# Patient Record
Sex: FEMALE | Race: WHITE
[De-identification: names, ages, dates, MRNs, and addresses within clinical notes are randomized per-mention and may not be internally consistent; named-entity substitution may affect disease eponyms.]

---

## 2018-02-11 ENCOUNTER — HOSPITAL ENCOUNTER (INPATIENT)
Dept: HOSPITAL 47 - EC | Age: 63
LOS: 2 days | Discharge: ANOTHER HOSPITAL | DRG: 871 | End: 2018-02-13
Attending: HOSPITALIST | Admitting: HOSPITALIST
Payer: COMMERCIAL

## 2018-02-11 VITALS — BODY MASS INDEX: 18.6 KG/M2

## 2018-02-11 DIAGNOSIS — Z79.899: ICD-10-CM

## 2018-02-11 DIAGNOSIS — Z87.440: ICD-10-CM

## 2018-02-11 DIAGNOSIS — K70.30: ICD-10-CM

## 2018-02-11 DIAGNOSIS — J18.9: ICD-10-CM

## 2018-02-11 DIAGNOSIS — F10.10: ICD-10-CM

## 2018-02-11 DIAGNOSIS — E87.1: ICD-10-CM

## 2018-02-11 DIAGNOSIS — G93.41: ICD-10-CM

## 2018-02-11 DIAGNOSIS — E86.0: ICD-10-CM

## 2018-02-11 DIAGNOSIS — A40.9: Primary | ICD-10-CM

## 2018-02-11 DIAGNOSIS — E87.4: ICD-10-CM

## 2018-02-11 DIAGNOSIS — Z16.11: ICD-10-CM

## 2018-02-11 DIAGNOSIS — I48.91: ICD-10-CM

## 2018-02-11 DIAGNOSIS — F17.200: ICD-10-CM

## 2018-02-11 DIAGNOSIS — N39.0: ICD-10-CM

## 2018-02-11 DIAGNOSIS — D69.6: ICD-10-CM

## 2018-02-11 DIAGNOSIS — R65.21: ICD-10-CM

## 2018-02-11 DIAGNOSIS — J96.01: ICD-10-CM

## 2018-02-11 DIAGNOSIS — E83.51: ICD-10-CM

## 2018-02-11 DIAGNOSIS — N17.9: ICD-10-CM

## 2018-02-11 LAB
ALBUMIN SERPL-MCNC: 2.5 G/DL (ref 3.5–5)
ALP SERPL-CCNC: 63 U/L (ref 38–126)
ALT SERPL-CCNC: 39 U/L (ref 9–52)
ANION GAP SERPL CALC-SCNC: 15 MMOL/L
APTT BLD: 22.8 SEC (ref 22–30)
AST SERPL-CCNC: 115 U/L (ref 14–36)
BILIRUB UR QL STRIP.AUTO: (no result)
BUN SERPL-SCNC: 104 MG/DL (ref 7–17)
CALCIUM SPEC-MCNC: 9.3 MG/DL (ref 8.4–10.2)
CELLS COUNTED: 200
CHLORIDE SERPL-SCNC: 91 MMOL/L (ref 98–107)
CO2 BLDA-SCNC: 22 MMOL/L (ref 19–24)
CO2 BLDA-SCNC: 25 MMOL/L (ref 19–24)
CO2 SERPL-SCNC: 28 MMOL/L (ref 22–30)
ERYTHROCYTE [DISTWIDTH] IN BLOOD BY AUTOMATED COUNT: 4.99 M/UL (ref 3.8–5.4)
ERYTHROCYTE [DISTWIDTH] IN BLOOD: 13.2 % (ref 11.5–15.5)
GLUCOSE BLD-MCNC: 105 MG/DL (ref 75–99)
GLUCOSE BLD-MCNC: 60 MG/DL (ref 75–99)
GLUCOSE SERPL-MCNC: 88 MG/DL (ref 74–99)
HCO3 BLDA-SCNC: 21 MMOL/L (ref 21–25)
HCO3 BLDA-SCNC: 23 MMOL/L (ref 21–25)
HCT VFR BLD AUTO: 50.5 % (ref 34–46)
HGB BLD-MCNC: 16.6 GM/DL (ref 11.4–16)
HYALINE CASTS UR QL AUTO: 125 /LPF (ref 0–2)
INR PPP: 1 (ref ?–1.2)
LYMPHOCYTES # BLD MANUAL: 0.53 K/UL (ref 1–4.8)
MAGNESIUM SPEC-SCNC: 2.3 MG/DL (ref 1.6–2.3)
MCH RBC QN AUTO: 33.3 PG (ref 25–35)
MCHC RBC AUTO-ENTMCNC: 32.9 G/DL (ref 31–37)
MCV RBC AUTO: 101.2 FL (ref 80–100)
NEUTROPHILS NFR BLD MANUAL: 42 %
NEUTS SEG # BLD MANUAL: 10 K/UL (ref 1.3–7.7)
PCO2 BLDA: 58 MMHG (ref 35–45)
PCO2 BLDA: 69 MMHG (ref 35–45)
PH BLDA: 7.13 [PH] (ref 7.35–7.45)
PH BLDA: 7.16 [PH] (ref 7.35–7.45)
PH UR: 5 [PH] (ref 5–8)
PLATELET # BLD AUTO: 213 K/UL (ref 150–450)
PO2 BLDA: 59 MMHG (ref 83–108)
PO2 BLDA: 82 MMHG (ref 83–108)
POTASSIUM SERPL-SCNC: 4.2 MMOL/L (ref 3.5–5.1)
PROT SERPL-MCNC: 4.9 G/DL (ref 6.3–8.2)
PROT UR QL: (no result)
PT BLD: 10.1 SEC (ref 9–12)
RBC UR QL: 1 /HPF (ref 0–5)
SODIUM SERPL-SCNC: 134 MMOL/L (ref 137–145)
SP GR UR: 1.02 (ref 1–1.03)
SQUAMOUS UR QL AUTO: 2 /HPF (ref 0–4)
UROBILINOGEN UR QL STRIP: 6 MG/DL (ref ?–2)
WBC # BLD AUTO: 10.6 K/UL (ref 3.8–10.6)
WBC #/AREA URNS HPF: 2 /HPF (ref 0–5)

## 2018-02-11 PROCEDURE — 85610 PROTHROMBIN TIME: CPT

## 2018-02-11 PROCEDURE — 96368 THER/DIAG CONCURRENT INF: CPT

## 2018-02-11 PROCEDURE — 87086 URINE CULTURE/COLONY COUNT: CPT

## 2018-02-11 PROCEDURE — 87502 INFLUENZA DNA AMP PROBE: CPT

## 2018-02-11 PROCEDURE — 51701 INSERT BLADDER CATHETER: CPT

## 2018-02-11 PROCEDURE — 96375 TX/PRO/DX INJ NEW DRUG ADDON: CPT

## 2018-02-11 PROCEDURE — 81001 URINALYSIS AUTO W/SCOPE: CPT

## 2018-02-11 PROCEDURE — 96365 THER/PROPH/DIAG IV INF INIT: CPT

## 2018-02-11 PROCEDURE — 85025 COMPLETE CBC W/AUTO DIFF WBC: CPT

## 2018-02-11 PROCEDURE — 5A1945Z RESPIRATORY VENTILATION, 24-96 CONSECUTIVE HOURS: ICD-10-PCS

## 2018-02-11 PROCEDURE — 87186 SC STD MICRODIL/AGAR DIL: CPT

## 2018-02-11 PROCEDURE — 85730 THROMBOPLASTIN TIME PARTIAL: CPT

## 2018-02-11 PROCEDURE — 82805 BLOOD GASES W/O2 SATURATION: CPT

## 2018-02-11 PROCEDURE — 96361 HYDRATE IV INFUSION ADD-ON: CPT

## 2018-02-11 PROCEDURE — 36600 WITHDRAWAL OF ARTERIAL BLOOD: CPT

## 2018-02-11 PROCEDURE — 84100 ASSAY OF PHOSPHORUS: CPT

## 2018-02-11 PROCEDURE — 80306 DRUG TEST PRSMV INSTRMNT: CPT

## 2018-02-11 PROCEDURE — 87040 BLOOD CULTURE FOR BACTERIA: CPT

## 2018-02-11 PROCEDURE — 87070 CULTURE OTHR SPECIMN AEROBIC: CPT

## 2018-02-11 PROCEDURE — 0BH18EZ INSERTION OF ENDOTRACHEAL AIRWAY INTO TRACHEA, VIA NATURAL OR ARTIFICIAL OPENING ENDOSCOPIC: ICD-10-PCS

## 2018-02-11 PROCEDURE — 36556 INSERT NON-TUNNEL CV CATH: CPT

## 2018-02-11 PROCEDURE — 70450 CT HEAD/BRAIN W/O DYE: CPT

## 2018-02-11 PROCEDURE — 31500 INSERT EMERGENCY AIRWAY: CPT

## 2018-02-11 PROCEDURE — 87205 SMEAR GRAM STAIN: CPT

## 2018-02-11 PROCEDURE — 83605 ASSAY OF LACTIC ACID: CPT

## 2018-02-11 PROCEDURE — 96366 THER/PROPH/DIAG IV INF ADDON: CPT

## 2018-02-11 PROCEDURE — 93005 ELECTROCARDIOGRAM TRACING: CPT

## 2018-02-11 PROCEDURE — 94002 VENT MGMT INPAT INIT DAY: CPT

## 2018-02-11 PROCEDURE — 83735 ASSAY OF MAGNESIUM: CPT

## 2018-02-11 PROCEDURE — 99285 EMERGENCY DEPT VISIT HI MDM: CPT

## 2018-02-11 PROCEDURE — 94003 VENT MGMT INPAT SUBQ DAY: CPT

## 2018-02-11 PROCEDURE — 02HV33Z INSERTION OF INFUSION DEVICE INTO SUPERIOR VENA CAVA, PERCUTANEOUS APPROACH: ICD-10-PCS

## 2018-02-11 PROCEDURE — 80048 BASIC METABOLIC PNL TOTAL CA: CPT

## 2018-02-11 PROCEDURE — 71045 X-RAY EXAM CHEST 1 VIEW: CPT

## 2018-02-11 PROCEDURE — 80053 COMPREHEN METABOLIC PANEL: CPT

## 2018-02-11 PROCEDURE — 84484 ASSAY OF TROPONIN QUANT: CPT

## 2018-02-11 PROCEDURE — 82330 ASSAY OF CALCIUM: CPT

## 2018-02-11 PROCEDURE — 36415 COLL VENOUS BLD VENIPUNCTURE: CPT

## 2018-02-11 PROCEDURE — 93306 TTE W/DOPPLER COMPLETE: CPT

## 2018-02-11 PROCEDURE — 87077 CULTURE AEROBIC IDENTIFY: CPT

## 2018-02-11 RX ADMIN — CEFAZOLIN STA MLS/HR: 330 INJECTION, POWDER, FOR SOLUTION INTRAMUSCULAR; INTRAVENOUS at 19:50

## 2018-02-11 RX ADMIN — CEFAZOLIN SCH MLS/HR: 330 INJECTION, POWDER, FOR SOLUTION INTRAMUSCULAR; INTRAVENOUS at 22:03

## 2018-02-11 RX ADMIN — Medication SCH MLS/HR: at 19:49

## 2018-02-11 RX ADMIN — PROPOFOL SCH MLS/HR: 10 INJECTION, EMULSION INTRAVENOUS at 22:04

## 2018-02-11 RX ADMIN — CEFAZOLIN STA MLS/HR: 330 INJECTION, POWDER, FOR SOLUTION INTRAMUSCULAR; INTRAVENOUS at 15:20

## 2018-02-11 RX ADMIN — Medication SCH MLS/HR: at 23:39

## 2018-02-11 RX ADMIN — HEPARIN SODIUM SCH MLS/HR: 5000 INJECTION, SOLUTION INTRAVENOUS at 19:48

## 2018-02-11 RX ADMIN — HEPARIN SODIUM SCH MLS/HR: 5000 INJECTION, SOLUTION INTRAVENOUS at 17:30

## 2018-02-11 RX ADMIN — SODIUM CHLORIDE SCH: 900 INJECTION, SOLUTION INTRAVENOUS at 21:59

## 2018-02-11 RX ADMIN — Medication SCH MLS/HR: at 17:27

## 2018-02-11 RX ADMIN — CEFAZOLIN SCH MLS/HR: 330 INJECTION, POWDER, FOR SOLUTION INTRAMUSCULAR; INTRAVENOUS at 23:39

## 2018-02-11 RX ADMIN — DILTIAZEM HYDROCHLORIDE ONE MLS/HR: 5 INJECTION INTRAVENOUS at 14:34

## 2018-02-11 RX ADMIN — CEFAZOLIN STA MLS/HR: 330 INJECTION, POWDER, FOR SOLUTION INTRAMUSCULAR; INTRAVENOUS at 19:48

## 2018-02-11 RX ADMIN — NICARDIPINE HYDROCHLORIDE SCH MLS/HR: 2.5 INJECTION INTRAVENOUS at 14:25

## 2018-02-11 RX ADMIN — NICARDIPINE HYDROCHLORIDE SCH MLS/HR: 2.5 INJECTION INTRAVENOUS at 14:26

## 2018-02-11 RX ADMIN — DILTIAZEM HYDROCHLORIDE ONE MLS/HR: 5 INJECTION INTRAVENOUS at 19:50

## 2018-02-11 NOTE — XR
EXAMINATION TYPE: XR chest 1V portable

 

DATE OF EXAM: 2/11/2018

 

COMPARISON: 12/13/2013

 

INDICATION: Fever weakness

 

TECHNIQUE: Single frontal view of the chest is obtained.

 

FINDINGS:  

The heart size is normal.  

The pulmonary vasculature is normal.  

Bibasilar infiltrates are present. Small posterior right infiltrate is present. A larger consolidatio
n is within the lingula.

Old right rib fractures are evident.

 

IMPRESSION:  

1. Bibasilar infiltrates within the lingula and posterior medial right lower lobe.

## 2018-02-11 NOTE — XR
EXAMINATION TYPE: XR chest 1V portable

 

DATE OF EXAM: 2/11/2018

 

COMPARISON: Same date earlier exam

 

INDICATION: Pain ET NG tube placement, patient and sepsis, volume overload

 

TECHNIQUE: Single frontal view of the chest is obtained.

 

FINDINGS:  

The heart size is normal.  

The pulmonary vasculature is normal.  

Left lower lobe and right lower lobe infiltrates are increasing from prior. No pleural effusion is ev
ident. Patient

Central venous catheter is stable.  

Endotracheal tube is in place the tip in the proximal right bronchus. In conversation with the emerge
ncy room physician, this was pulled back approximately 3 to 4 cm. Nasogastric tube transverses the th
orax, tip is in the left upper quadrant of the abdomen.

 

IMPRESSION:  

1. Endotracheal tube tip within the proximal right bronchus. This was pulled back following this imag
e.

2. Nasogastric tube tip within the left upper quadrant of the abdomen.

3. Worsening bibasilar infiltrates.

## 2018-02-11 NOTE — CT
EXAMINATION TYPE: CT brain wo con

 

DATE OF EXAM: 2/11/2018

 

COMPARISON: 2/13/2013

 

INDICATION: Confusion and weakness.

 

DLP: 1012.7 mGycm, Automated exposure control for dose reduction was used.

 

CONTRAST: None

 

CT of the brain is performed utilizing 3 mm thick sections through the posterior fossa and 3 mm thick
 sections through the remaining calvarium.  Study is performed within 24 hours of arrival to the hosp
ital. 

 

No abnormal hyperdensity is present to suggest an acute intracranial hemorrhage.

No mass lesion is evident.

No acute infarcts are evident. Periventricular white matter hypodensity is present, likely on the bas
is of confluent white matter ischemic type changes.

Ventricles are mildly prominent for the patient age.  Sulci appear appropriate for the patient age. N
o hydrocephalus is evident.

Paranasal sinuses and mastoid air cells within the field-of-view are clear.

 

IMPRESSIONS:

1.   Periventricular white matter ischemic changes and mild atrophy.

## 2018-02-11 NOTE — XR
EXAMINATION TYPE: XR chest 1V

 

DATE OF EXAM: 2/11/2018

 

COMPARISON: NONE

 

INDICATION: ET tube placement, adjustment, septic shock

 

TECHNIQUE: Single frontal view of the chest is obtained.

 

FINDINGS:  

The heart size is normal.  

The pulmonary vasculature is normal.  

Left mid and lower lobe consolidation appears somewhat denser. The right lower lobe consolidation is 
similar.

 

The endotracheal tube is been pulled back with the tip approximately 2.3 cm above the brittanie. This ca
n come back somewhat further as well. The central venous catheter tip within the superior vena cava r
egion is stable. The nasogastric tube tip is in the left upper quadrant of the abdomen.

 

 

IMPRESSION:  

1. Dense infiltrates within the left lower lobe and medial right lower lobe.

2. Endotracheal tube is been pulled back. This is at the lower extent of acceptable positioning and c
an be pulled back 2 cm for adjustment.

## 2018-02-11 NOTE — XR
EXAMINATION TYPE: XR chest 1V portable

 

DATE OF EXAM: 2/11/2018

 

COMPARISON: NONE

 

INDICATION: Pain Central line placement

 

TECHNIQUE: Single frontal view of the chest is obtained.

 

FINDINGS:  

The heart size is normal.  

The pulmonary vasculature is normal.  

There is worsening infiltrate through the left lung.

 

There is insertion of a right central venous catheter with the tip in the superior vena cava region. 
No pneumothorax is evident. EKG leads overlie the chest. Right lower lobe infiltrate remains present.
  

Note is made of a chronic rotator cuff tear on the right. There is likely a chronic rotator cuff tear
 on the left as well.

 

IMPRESSION:  

1. Worsening bibasilar infiltrates extending into the left upper lung field.

2. Right central venous catheter tip in the superior vena cava region. No pneumothorax is evident.

## 2018-02-11 NOTE — ED
General Adult HPI





- General


Stated complaint: Fatigue


Time Seen by Provider: 02/11/18 13:58


Source: patient, family (Daughter)


Mode of arrival: EMS


Limitations: no limitations





- History of Present Illness


Initial comments: 





Patient brought in by ambulance for fatigue.  Patient lives at home alone, has 

a neighbor that checks on her regularly.  Daughter bedside states neighbor 

encouraged her to come to the hospital last night, however patient refused.  

Today neighbor called EMS.  EMS states they found patient lying in bed, soaked 

in her own urine.  Patient appeared very fatigued, however was anal 4, without 

focal neuro deficits.  EMS states patient had a bottle of amoxicillin at home, 

states recently treated for urinary tract infection.  Patient denies any past 

medical history, denies any daily medications.  Daughter bedside confirm she 

does not think patient is on daily medications.  Patient complains of 

generalized fatigue, thirst.  Patient denies any pain.  Patient denies fevers, 

chills.  Patient states she has not eaten in the past week.  Has spent most of 

her time in bed over the past week.  Patient admits to daily drinking 

approximately 6 beers a day, however states she has not drank any alcohol in 

the past 1 month.  Patient is current smoker.  Patient denies history of 

cardiac disease, history of A. fib.  Per EMS patient's heart rate was 140-170, 

A. fib on EKG.





Patient history limited by her fatigue, poor historian.  Daughter at bedside 

states she does not see or talk to patient much, so she does not know her 

history well.





- Related Data


 Home Medications











 Medication  Instructions  Recorded  Confirmed


 


Tolterodine Tartrate [Detrol LA] 2 mg PO DAILY 02/11/18 02/11/18


 


Vitamin B Complex 1 cap PO DAILY 02/11/18 02/11/18











 Allergies











Allergy/AdvReac Type Severity Reaction Status Date / Time


 


No Known Allergies Allergy   Verified 02/11/18 15:51














Review of Systems


ROS Statement: 


Those systems with pertinent positive or pertinent negative responses have been 

documented in the HPI.





ROS Other: All systems not noted in ROS Statement are negative.


Constitutional: Reports: weakness (generalized).  Denies: fever, chills


Eyes: Denies: vision change


ENT: Denies: ear pain, throat pain, congestion


Respiratory: Reports: cough (nonproductive)


Cardiovascular: Denies: chest pain, palpitations, edema, syncope


Endocrine: Reports: fatigue, other (decreased urination)


Gastrointestinal: Denies: abdominal pain, nausea, vomiting, diarrhea, 

constipation, melena


Genitourinary: Reports: other (dec urination).  Denies: urgency, dysuria, 

hematuria


Skin: Denies: rash


Neurological: Reports: weakness (generalized).  Denies: headache, numbness, 

paresthesias, confusion





General Exam





- General Exam Comments


Initial Comments: 





Patient laying in bed, appears fatigued, mildly ill.  Alert, converses softly.


General appearance: alert, in no apparent distress


Head exam: Present: atraumatic, normocephalic


Eye exam: Present: normal appearance, PERRL, EOMI


ENT exam: Present: mucous membranes dry


Neck exam: Present: normal inspection


Respiratory exam: Present: rales, other (Course breath sounds bilaterally).  

Absent: respiratory distress, wheezes


Cardiovascular Exam: Present: tachycardia, irregular rhythm


GI/Abdominal exam: Present: soft.  Absent: distended, tenderness, guarding, 

rebound, rigid


Extremities exam: Present: normal inspection


Back exam: Present: normal inspection


Neurological exam: Present: alert, oriented X3, CN II-XII intact (Movements and 

sensation intact in all extremities.  Patient is alert, oriented to name, month

, living situation, date of birth)


Psychiatric exam: Present: normal affect, normal mood


Skin exam: Present: warm, dry, intact, normal color.  Absent: rash, cyanosis, 

erythema





Course


 Vital Signs











  02/11/18 02/11/18 02/11/18





  14:05 14:13 14:15


 


Temperature 98.4 F  


 


Pulse Rate 166 H 144 H 


 


Pulse Rate [   166 H





Cardiac Monitor   





]   


 


Respiratory 98 H 16 





Rate   


 


Blood Pressure 102/61 91/54 


 


O2 Sat by Pulse 98  





Oximetry   














  02/11/18 02/11/18 02/11/18





  14:35 14:45 14:55


 


Temperature   


 


Pulse Rate 161 H 156 H 139 H


 


Pulse Rate [   





Cardiac Monitor   





]   


 


Respiratory 18 18 16





Rate   


 


Blood Pressure 94/59 88/57 89/52


 


O2 Sat by Pulse   





Oximetry   














  02/11/18 02/11/18 02/11/18





  15:24 16:06 16:21


 


Temperature 98.7 F  


 


Pulse Rate 158 H 134 H 139 H


 


Pulse Rate [   





Cardiac Monitor   





]   


 


Respiratory 18 18 20





Rate   


 


Blood Pressure 93/55 84/53 81/51


 


O2 Sat by Pulse   91 L





Oximetry   














Procedures





- Central Line Placement





  ** Right IJ


Consent Obtained: written consent


Time Out Performed: Yes


Patient Placed on Monitor/Pulse Ox: Yes


MD Prep: mask, gown, gloves


Central Line Prep: Povidone-Iodine 1%


Local Anesthesia Used: Lidocaine 1%


Ultrasound Used for Placement: Yes


Central Line Lumen Inserted: triple


Central Line Position: good blood return, all ports aspirated, flushed, capped, 

sutured in place with 3-0 nylon


Dressing Applied: Tegaderm


Post Procedure X-Ray: tip of catheter in good position


Patient Tolerated Procedure: well


Complications: none





- Sepsis


  ** Sepsis Focused Exam #1


Time Sepsis Criteria Met: 16:15


Sepsis Focused Exam Complete: Yes


Vital Signs & RN Notes Reviewed: Yes


Capillary Refill: < 2 Seconds: Fingers, Toes


Peripheral Pulses: Normal: Radial (R), Radial (L), Dorsalis Pedis (R), Dorsalis 

Pedis (L)


Skin Color: Normal for Patient


Respiratory Exam: rhonchi


Cardiovascular Exam: tachycardia





Medical Decision Making





- Medical Decision Making





Patient appears fatigued on arrival.  Decreased oral intake recently.  

Complaining of thirst.  Likely dehydration.  Possible sepsis, this patient has 

history of possible recent urinary tract infection.  Patient preflight really 

treated for UTI and possible community-acquired pneumonia given her cough. 





Patient tachycardic on arrival, low blood pressure, will continue with IV fluid 

boluses.  We'll start Cardizem drip.  Cardizem bolus held secondary to low 

blood pressure.





The patient A&O x 4 on exam.  Patient speech does appear fatigued, however is 

clear and appropriate.  No focal neuro deficits appreciated on exam.  

Symmetrical facial movements, no facial droop.  Patient denies falls or head 

trauma.  No sign of trauma on exam.





Daughter bedside does note patient is usually a heavy drinker, sometimes 6 

large beers that were 10-12% ETOH each.  States that if she doesn't drink beer 

then she drinks ice tea and vodka. Pt denies any drug use. 





Patient not tolerating Cardizem drip 2/2 lo, we'll continue with IV hydration.





Patient with bilateral pneumonias on chest x-ray, sepsis protocol arty initiated

, patient given azithromycin and Rocephin for possible [facility] pneumonia.





Patient given 30 mL per KG fluid boluses, heart rate improved to 130s, however 

blood pressure remains 90 systolic.  We'll place central line, start Levophed





SPoke with intesivist Dr. Logan, updated patient condition results, agrees with 

ICU admission





Spoke with admitting physician Dr. Vaughan, updated patient condition results, 

agrees with ICU admission, requests Zosyn antibiotics





Patient daughter updated without results and plan.





Central line placed without difficulty.  Patient tolerated procedure well.  

Patient will be admitted to the intensive care unit at this time.


.





- Lab Data


Result diagrams: 


 02/11/18 14:00





 02/11/18 14:00


 Lab Results











  02/11/18 02/11/18 02/11/18 Range/Units





  14:00 14:00 14:00 


 


WBC  10.6    (3.8-10.6)  k/uL


 


RBC  4.99    (3.80-5.40)  m/uL


 


Hgb  16.6 H    (11.4-16.0)  gm/dL


 


Hct  50.5 H    (34.0-46.0)  %


 


MCV  101.2 H    (80.0-100.0)  fL


 


MCH  33.3    (25.0-35.0)  pg


 


MCHC  32.9    (31.0-37.0)  g/dL


 


RDW  13.2    (11.5-15.5)  %


 


Plt Count  213    (150-450)  k/uL


 


Neutrophils % (Manual)  42    %


 


Band Neutrophils %  53    %


 


Lymphocytes % (Manual)  5    %


 


Neutrophils # (Manual)  10.00 H    (1.3-7.7)  k/uL


 


Lymphocytes # (Manual)  0.53 L    (1.0-4.8)  k/uL


 


Nucleated RBCs  0    (0-0)  /100 WBC


 


Manual Slide Review  Performed    


 


Toxic Granulation  Present    


 


Toxic Vacuolation  Present    


 


Large Platelets  Present    


 


Polychromasia  Present    


 


Macrocytosis  Slight    


 


PT     (9.0-12.0)  sec


 


INR     (<1.2)  


 


APTT     (22.0-30.0)  sec


 


Sodium   134 L   (137-145)  mmol/L


 


Potassium   4.2   (3.5-5.1)  mmol/L


 


Chloride   91 L   ()  mmol/L


 


Carbon Dioxide   28   (22-30)  mmol/L


 


Anion Gap   15   mmol/L


 


BUN   104 H*   (7-17)  mg/dL


 


Creatinine   1.30 H   (0.52-1.04)  mg/dL


 


Est GFR (MDRD) Af Amer   50   (>60 ml/min/1.73 sqM)  


 


Est GFR (MDRD) Non-Af   42   (>60 ml/min/1.73 sqM)  


 


Glucose   88   (74-99)  mg/dL


 


Plasma Lactic Acid Cam    4.5 H*  (0.7-2.0)  mmol/L


 


Calcium   9.3   (8.4-10.2)  mg/dL


 


Magnesium   2.3   (1.6-2.3)  mg/dL


 


Total Bilirubin   2.1 H   (0.2-1.3)  mg/dL


 


AST   115 H   (14-36)  U/L


 


ALT   39   (9-52)  U/L


 


Alkaline Phosphatase   63   ()  U/L


 


Troponin I     (0.000-0.034)  ng/mL


 


Total Protein   4.9 L   (6.3-8.2)  g/dL


 


Albumin   2.5 L   (3.5-5.0)  g/dL


 


Urine Color     


 


Urine Appearance     (Clear)  


 


Urine pH     (5.0-8.0)  


 


Ur Specific Gravity     (1.001-1.035)  


 


Urine Protein     (Negative)  


 


Urine Glucose (UA)     (Negative)  


 


Urine Ketones     (Negative)  


 


Urine Blood     (Negative)  


 


Urine Nitrite     (Negative)  


 


Urine Bilirubin     (Negative)  


 


Urine Urobilinogen     (<2.0)  mg/dL


 


Ur Leukocyte Esterase     (Negative)  


 


Urine RBC     (0-5)  /hpf


 


Urine WBC     (0-5)  /hpf


 


Ur Squamous Epith Cells     (0-4)  /hpf


 


Amorphous Sediment     (None)  /hpf


 


Hyaline Casts     (0-2)  /lpf


 


Urine Mucus     (None)  /hpf


 


Influenza Type A RNA     (Not Detectd)  


 


Influenza Type B (PCR)     (Not Detectd)  














  02/11/18 02/11/18 02/11/18 Range/Units





  14:00 14:00 14:00 


 


WBC     (3.8-10.6)  k/uL


 


RBC     (3.80-5.40)  m/uL


 


Hgb     (11.4-16.0)  gm/dL


 


Hct     (34.0-46.0)  %


 


MCV     (80.0-100.0)  fL


 


MCH     (25.0-35.0)  pg


 


MCHC     (31.0-37.0)  g/dL


 


RDW     (11.5-15.5)  %


 


Plt Count     (150-450)  k/uL


 


Neutrophils % (Manual)     %


 


Band Neutrophils %     %


 


Lymphocytes % (Manual)     %


 


Neutrophils # (Manual)     (1.3-7.7)  k/uL


 


Lymphocytes # (Manual)     (1.0-4.8)  k/uL


 


Nucleated RBCs     (0-0)  /100 WBC


 


Manual Slide Review     


 


Toxic Granulation     


 


Toxic Vacuolation     


 


Large Platelets     


 


Polychromasia     


 


Macrocytosis     


 


PT  10.1    (9.0-12.0)  sec


 


INR  1.0    (<1.2)  


 


APTT  22.8    (22.0-30.0)  sec


 


Sodium     (137-145)  mmol/L


 


Potassium     (3.5-5.1)  mmol/L


 


Chloride     ()  mmol/L


 


Carbon Dioxide     (22-30)  mmol/L


 


Anion Gap     mmol/L


 


BUN     (7-17)  mg/dL


 


Creatinine     (0.52-1.04)  mg/dL


 


Est GFR (MDRD) Af Amer     (>60 ml/min/1.73 sqM)  


 


Est GFR (MDRD) Non-Af     (>60 ml/min/1.73 sqM)  


 


Glucose     (74-99)  mg/dL


 


Plasma Lactic Acid Cam     (0.7-2.0)  mmol/L


 


Calcium     (8.4-10.2)  mg/dL


 


Magnesium     (1.6-2.3)  mg/dL


 


Total Bilirubin     (0.2-1.3)  mg/dL


 


AST     (14-36)  U/L


 


ALT     (9-52)  U/L


 


Alkaline Phosphatase     ()  U/L


 


Troponin I   0.024   (0.000-0.034)  ng/mL


 


Total Protein     (6.3-8.2)  g/dL


 


Albumin     (3.5-5.0)  g/dL


 


Urine Color    Dark Brown  


 


Urine Appearance    Cloudy H  (Clear)  


 


Urine pH    5.0  (5.0-8.0)  


 


Ur Specific Gravity    1.017  (1.001-1.035)  


 


Urine Protein    1+ H  (Negative)  


 


Urine Glucose (UA)    Negative  (Negative)  


 


Urine Ketones    Negative  (Negative)  


 


Urine Blood    Negative  (Negative)  


 


Urine Nitrite    Negative  (Negative)  


 


Urine Bilirubin    1+ H  (Negative)  


 


Urine Urobilinogen    6.0  (<2.0)  mg/dL


 


Ur Leukocyte Esterase    Negative  (Negative)  


 


Urine RBC    1  (0-5)  /hpf


 


Urine WBC    2  (0-5)  /hpf


 


Ur Squamous Epith Cells    2  (0-4)  /hpf


 


Amorphous Sediment    Rare H  (None)  /hpf


 


Hyaline Casts    125 H  (0-2)  /lpf


 


Urine Mucus    Rare H  (None)  /hpf


 


Influenza Type A RNA     (Not Detectd)  


 


Influenza Type B (PCR)     (Not Detectd)  














  02/11/18 Range/Units





  14:20 


 


WBC   (3.8-10.6)  k/uL


 


RBC   (3.80-5.40)  m/uL


 


Hgb   (11.4-16.0)  gm/dL


 


Hct   (34.0-46.0)  %


 


MCV   (80.0-100.0)  fL


 


MCH   (25.0-35.0)  pg


 


MCHC   (31.0-37.0)  g/dL


 


RDW   (11.5-15.5)  %


 


Plt Count   (150-450)  k/uL


 


Neutrophils % (Manual)   %


 


Band Neutrophils %   %


 


Lymphocytes % (Manual)   %


 


Neutrophils # (Manual)   (1.3-7.7)  k/uL


 


Lymphocytes # (Manual)   (1.0-4.8)  k/uL


 


Nucleated RBCs   (0-0)  /100 WBC


 


Manual Slide Review   


 


Toxic Granulation   


 


Toxic Vacuolation   


 


Large Platelets   


 


Polychromasia   


 


Macrocytosis   


 


PT   (9.0-12.0)  sec


 


INR   (<1.2)  


 


APTT   (22.0-30.0)  sec


 


Sodium   (137-145)  mmol/L


 


Potassium   (3.5-5.1)  mmol/L


 


Chloride   ()  mmol/L


 


Carbon Dioxide   (22-30)  mmol/L


 


Anion Gap   mmol/L


 


BUN   (7-17)  mg/dL


 


Creatinine   (0.52-1.04)  mg/dL


 


Est GFR (MDRD) Af Amer   (>60 ml/min/1.73 sqM)  


 


Est GFR (MDRD) Non-Af   (>60 ml/min/1.73 sqM)  


 


Glucose   (74-99)  mg/dL


 


Plasma Lactic Acid Cam   (0.7-2.0)  mmol/L


 


Calcium   (8.4-10.2)  mg/dL


 


Magnesium   (1.6-2.3)  mg/dL


 


Total Bilirubin   (0.2-1.3)  mg/dL


 


AST   (14-36)  U/L


 


ALT   (9-52)  U/L


 


Alkaline Phosphatase   ()  U/L


 


Troponin I   (0.000-0.034)  ng/mL


 


Total Protein   (6.3-8.2)  g/dL


 


Albumin   (3.5-5.0)  g/dL


 


Urine Color   


 


Urine Appearance   (Clear)  


 


Urine pH   (5.0-8.0)  


 


Ur Specific Gravity   (1.001-1.035)  


 


Urine Protein   (Negative)  


 


Urine Glucose (UA)   (Negative)  


 


Urine Ketones   (Negative)  


 


Urine Blood   (Negative)  


 


Urine Nitrite   (Negative)  


 


Urine Bilirubin   (Negative)  


 


Urine Urobilinogen   (<2.0)  mg/dL


 


Ur Leukocyte Esterase   (Negative)  


 


Urine RBC   (0-5)  /hpf


 


Urine WBC   (0-5)  /hpf


 


Ur Squamous Epith Cells   (0-4)  /hpf


 


Amorphous Sediment   (None)  /hpf


 


Hyaline Casts   (0-2)  /lpf


 


Urine Mucus   (None)  /hpf


 


Influenza Type A RNA  Not Detected  (Not Detectd)  


 


Influenza Type B (PCR)  Not Detected  (Not Detectd)  














Disposition


Clinical Impression: 


 Septic shock





Disposition: ADMITTED AS IP TO THIS HOSP


Condition: Good


Referrals: 


Morena Tomas MD [Primary Care Provider] - 1-2 days

## 2018-02-11 NOTE — ED
Medical Decision Making





- Lab Data


Result diagrams: 


 02/11/18 14:00





 02/11/18 14:00





 Lab Results











  02/11/18 02/11/18 02/11/18 Range/Units





  14:00 14:00 14:00 


 


WBC  10.6    (3.8-10.6)  k/uL


 


RBC  4.99    (3.80-5.40)  m/uL


 


Hgb  16.6 H    (11.4-16.0)  gm/dL


 


Hct  50.5 H    (34.0-46.0)  %


 


MCV  101.2 H    (80.0-100.0)  fL


 


MCH  33.3    (25.0-35.0)  pg


 


MCHC  32.9    (31.0-37.0)  g/dL


 


RDW  13.2    (11.5-15.5)  %


 


Plt Count  213    (150-450)  k/uL


 


Neutrophils % (Manual)  42    %


 


Band Neutrophils %  53    %


 


Lymphocytes % (Manual)  5    %


 


Neutrophils # (Manual)  10.00 H    (1.3-7.7)  k/uL


 


Lymphocytes # (Manual)  0.53 L    (1.0-4.8)  k/uL


 


Nucleated RBCs  0    (0-0)  /100 WBC


 


Manual Slide Review  Performed    


 


Toxic Granulation  Present    


 


Toxic Vacuolation  Present    


 


Large Platelets  Present    


 


Polychromasia  Present    


 


Macrocytosis  Slight    


 


PT     (9.0-12.0)  sec


 


INR     (<1.2)  


 


APTT     (22.0-30.0)  sec


 


Sodium   134 L   (137-145)  mmol/L


 


Potassium   4.2   (3.5-5.1)  mmol/L


 


Chloride   91 L   ()  mmol/L


 


Carbon Dioxide   28   (22-30)  mmol/L


 


Anion Gap   15   mmol/L


 


BUN   104 H*   (7-17)  mg/dL


 


Creatinine   1.30 H   (0.52-1.04)  mg/dL


 


Est GFR (MDRD) Af Amer   50   (>60 ml/min/1.73 sqM)  


 


Est GFR (MDRD) Non-Af   42   (>60 ml/min/1.73 sqM)  


 


Glucose   88   (74-99)  mg/dL


 


Plasma Lactic Acid Cam    4.5 H*  (0.7-2.0)  mmol/L


 


Calcium   9.3   (8.4-10.2)  mg/dL


 


Magnesium   2.3   (1.6-2.3)  mg/dL


 


Total Bilirubin   2.1 H   (0.2-1.3)  mg/dL


 


AST   115 H   (14-36)  U/L


 


ALT   39   (9-52)  U/L


 


Alkaline Phosphatase   63   ()  U/L


 


Troponin I     (0.000-0.034)  ng/mL


 


Total Protein   4.9 L   (6.3-8.2)  g/dL


 


Albumin   2.5 L   (3.5-5.0)  g/dL


 


Urine Color     


 


Urine Appearance     (Clear)  


 


Urine pH     (5.0-8.0)  


 


Ur Specific Gravity     (1.001-1.035)  


 


Urine Protein     (Negative)  


 


Urine Glucose (UA)     (Negative)  


 


Urine Ketones     (Negative)  


 


Urine Blood     (Negative)  


 


Urine Nitrite     (Negative)  


 


Urine Bilirubin     (Negative)  


 


Urine Urobilinogen     (<2.0)  mg/dL


 


Ur Leukocyte Esterase     (Negative)  


 


Urine RBC     (0-5)  /hpf


 


Urine WBC     (0-5)  /hpf


 


Ur Squamous Epith Cells     (0-4)  /hpf


 


Amorphous Sediment     (None)  /hpf


 


Hyaline Casts     (0-2)  /lpf


 


Urine Mucus     (None)  /hpf


 


Influenza Type A RNA     (Not Detectd)  


 


Influenza Type B (PCR)     (Not Detectd)  














  02/11/18 02/11/18 02/11/18 Range/Units





  14:00 14:00 14:00 


 


WBC     (3.8-10.6)  k/uL


 


RBC     (3.80-5.40)  m/uL


 


Hgb     (11.4-16.0)  gm/dL


 


Hct     (34.0-46.0)  %


 


MCV     (80.0-100.0)  fL


 


MCH     (25.0-35.0)  pg


 


MCHC     (31.0-37.0)  g/dL


 


RDW     (11.5-15.5)  %


 


Plt Count     (150-450)  k/uL


 


Neutrophils % (Manual)     %


 


Band Neutrophils %     %


 


Lymphocytes % (Manual)     %


 


Neutrophils # (Manual)     (1.3-7.7)  k/uL


 


Lymphocytes # (Manual)     (1.0-4.8)  k/uL


 


Nucleated RBCs     (0-0)  /100 WBC


 


Manual Slide Review     


 


Toxic Granulation     


 


Toxic Vacuolation     


 


Large Platelets     


 


Polychromasia     


 


Macrocytosis     


 


PT  10.1    (9.0-12.0)  sec


 


INR  1.0    (<1.2)  


 


APTT  22.8    (22.0-30.0)  sec


 


Sodium     (137-145)  mmol/L


 


Potassium     (3.5-5.1)  mmol/L


 


Chloride     ()  mmol/L


 


Carbon Dioxide     (22-30)  mmol/L


 


Anion Gap     mmol/L


 


BUN     (7-17)  mg/dL


 


Creatinine     (0.52-1.04)  mg/dL


 


Est GFR (MDRD) Af Amer     (>60 ml/min/1.73 sqM)  


 


Est GFR (MDRD) Non-Af     (>60 ml/min/1.73 sqM)  


 


Glucose     (74-99)  mg/dL


 


Plasma Lactic Acid Cam     (0.7-2.0)  mmol/L


 


Calcium     (8.4-10.2)  mg/dL


 


Magnesium     (1.6-2.3)  mg/dL


 


Total Bilirubin     (0.2-1.3)  mg/dL


 


AST     (14-36)  U/L


 


ALT     (9-52)  U/L


 


Alkaline Phosphatase     ()  U/L


 


Troponin I   0.024   (0.000-0.034)  ng/mL


 


Total Protein     (6.3-8.2)  g/dL


 


Albumin     (3.5-5.0)  g/dL


 


Urine Color    Dark Brown  


 


Urine Appearance    Cloudy H  (Clear)  


 


Urine pH    5.0  (5.0-8.0)  


 


Ur Specific Gravity    1.017  (1.001-1.035)  


 


Urine Protein    1+ H  (Negative)  


 


Urine Glucose (UA)    Negative  (Negative)  


 


Urine Ketones    Negative  (Negative)  


 


Urine Blood    Negative  (Negative)  


 


Urine Nitrite    Negative  (Negative)  


 


Urine Bilirubin    1+ H  (Negative)  


 


Urine Urobilinogen    6.0  (<2.0)  mg/dL


 


Ur Leukocyte Esterase    Negative  (Negative)  


 


Urine RBC    1  (0-5)  /hpf


 


Urine WBC    2  (0-5)  /hpf


 


Ur Squamous Epith Cells    2  (0-4)  /hpf


 


Amorphous Sediment    Rare H  (None)  /hpf


 


Hyaline Casts    125 H  (0-2)  /lpf


 


Urine Mucus    Rare H  (None)  /hpf


 


Influenza Type A RNA     (Not Detectd)  


 


Influenza Type B (PCR)     (Not Detectd)  














  02/11/18 Range/Units





  14:20 


 


WBC   (3.8-10.6)  k/uL


 


RBC   (3.80-5.40)  m/uL


 


Hgb   (11.4-16.0)  gm/dL


 


Hct   (34.0-46.0)  %


 


MCV   (80.0-100.0)  fL


 


MCH   (25.0-35.0)  pg


 


MCHC   (31.0-37.0)  g/dL


 


RDW   (11.5-15.5)  %


 


Plt Count   (150-450)  k/uL


 


Neutrophils % (Manual)   %


 


Band Neutrophils %   %


 


Lymphocytes % (Manual)   %


 


Neutrophils # (Manual)   (1.3-7.7)  k/uL


 


Lymphocytes # (Manual)   (1.0-4.8)  k/uL


 


Nucleated RBCs   (0-0)  /100 WBC


 


Manual Slide Review   


 


Toxic Granulation   


 


Toxic Vacuolation   


 


Large Platelets   


 


Polychromasia   


 


Macrocytosis   


 


PT   (9.0-12.0)  sec


 


INR   (<1.2)  


 


APTT   (22.0-30.0)  sec


 


Sodium   (137-145)  mmol/L


 


Potassium   (3.5-5.1)  mmol/L


 


Chloride   ()  mmol/L


 


Carbon Dioxide   (22-30)  mmol/L


 


Anion Gap   mmol/L


 


BUN   (7-17)  mg/dL


 


Creatinine   (0.52-1.04)  mg/dL


 


Est GFR (MDRD) Af Amer   (>60 ml/min/1.73 sqM)  


 


Est GFR (MDRD) Non-Af   (>60 ml/min/1.73 sqM)  


 


Glucose   (74-99)  mg/dL


 


Plasma Lactic Acid Cam   (0.7-2.0)  mmol/L


 


Calcium   (8.4-10.2)  mg/dL


 


Magnesium   (1.6-2.3)  mg/dL


 


Total Bilirubin   (0.2-1.3)  mg/dL


 


AST   (14-36)  U/L


 


ALT   (9-52)  U/L


 


Alkaline Phosphatase   ()  U/L


 


Troponin I   (0.000-0.034)  ng/mL


 


Total Protein   (6.3-8.2)  g/dL


 


Albumin   (3.5-5.0)  g/dL


 


Urine Color   


 


Urine Appearance   (Clear)  


 


Urine pH   (5.0-8.0)  


 


Ur Specific Gravity   (1.001-1.035)  


 


Urine Protein   (Negative)  


 


Urine Glucose (UA)   (Negative)  


 


Urine Ketones   (Negative)  


 


Urine Blood   (Negative)  


 


Urine Nitrite   (Negative)  


 


Urine Bilirubin   (Negative)  


 


Urine Urobilinogen   (<2.0)  mg/dL


 


Ur Leukocyte Esterase   (Negative)  


 


Urine RBC   (0-5)  /hpf


 


Urine WBC   (0-5)  /hpf


 


Ur Squamous Epith Cells   (0-4)  /hpf


 


Amorphous Sediment   (None)  /hpf


 


Hyaline Casts   (0-2)  /lpf


 


Urine Mucus   (None)  /hpf


 


Influenza Type A RNA  Not Detected  (Not Detectd)  


 


Influenza Type B (PCR)  Not Detected  (Not Detectd)  














Disposition


Clinical Impression: 


 Septic shock





Disposition: ADMITTED AS IP TO THIS [facility]


Condition: Good





Procedures





- Intubation


Time Out Performed: Yes


Sedative: Etomidate


Paralytic: Succinylcholine


Laryngoscope: [place]


Size: 4


ET Tube Size: 8


ET Tube Uncuffed: No


Tube Secured Depth (cm): 23


Tube Secured Location: lips


Tube Placement Confirmation: visualized tube passing through cords, equal 

breath sounds bilaterally, no breath sounds over epigastrium


Patient Tolerated Procedure: well


Intubation Complications: none





- Sepsis


  ** Sepsis Focused Exam #1


Time Sepsis Criteria Met: 16:15

## 2018-02-12 LAB
ALBUMIN SERPL-MCNC: 1.4 G/DL (ref 3.5–5)
ALP SERPL-CCNC: 39 U/L (ref 38–126)
ALT SERPL-CCNC: 34 U/L (ref 9–52)
ANION GAP SERPL CALC-SCNC: 9 MMOL/L
APTT BLD: 30.4 SEC (ref 22–30)
AST SERPL-CCNC: 87 U/L (ref 14–36)
BUN SERPL-SCNC: 74 MG/DL (ref 7–17)
CALCIUM SPEC-MCNC: 5.9 MG/DL (ref 8.4–10.2)
CELLS COUNTED: 100
CHLORIDE SERPL-SCNC: 114 MMOL/L (ref 98–107)
CO2 BLDA-SCNC: 19 MMOL/L (ref 19–24)
CO2 BLDA-SCNC: 19 MMOL/L (ref 19–24)
CO2 SERPL-SCNC: 17 MMOL/L (ref 22–30)
ERYTHROCYTE [DISTWIDTH] IN BLOOD BY AUTOMATED COUNT: 4.26 M/UL (ref 3.8–5.4)
ERYTHROCYTE [DISTWIDTH] IN BLOOD: 13.4 % (ref 11.5–15.5)
GLUCOSE SERPL-MCNC: 94 MG/DL (ref 74–99)
HCO3 BLDA-SCNC: 17 MMOL/L (ref 21–25)
HCO3 BLDA-SCNC: 17 MMOL/L (ref 21–25)
HCT VFR BLD AUTO: 45.9 % (ref 34–46)
HGB BLD-MCNC: 13.8 GM/DL (ref 11.4–16)
INR PPP: 1.1 (ref ?–1.2)
LYMPHOCYTES # BLD MANUAL: 0.71 K/UL (ref 1–4.8)
MAGNESIUM SPEC-SCNC: 1.7 MG/DL (ref 1.6–2.3)
MCH RBC QN AUTO: 32.5 PG (ref 25–35)
MCHC RBC AUTO-ENTMCNC: 30.1 G/DL (ref 31–37)
MCV RBC AUTO: 107.7 FL (ref 80–100)
MONOCYTES # BLD MANUAL: 0.14 K/UL (ref 0–1)
NEUTROPHILS NFR BLD MANUAL: 36 %
NEUTS SEG # BLD MANUAL: 13.2 K/UL (ref 1.3–7.7)
PCO2 BLDA: 53 MMHG (ref 35–45)
PCO2 BLDA: 55 MMHG (ref 35–45)
PH BLDA: 7.1 [PH] (ref 7.35–7.45)
PH BLDA: 7.12 [PH] (ref 7.35–7.45)
PLATELET # BLD AUTO: 138 K/UL (ref 150–450)
PO2 BLDA: 75 MMHG (ref 83–108)
PO2 BLDA: 76 MMHG (ref 83–108)
POTASSIUM SERPL-SCNC: 3.7 MMOL/L (ref 3.5–5.1)
PROT SERPL-MCNC: 3.1 G/DL (ref 6.3–8.2)
PT BLD: 10.5 SEC (ref 9–12)
SODIUM SERPL-SCNC: 140 MMOL/L (ref 137–145)
WBC # BLD AUTO: 14.1 K/UL (ref 3.8–10.6)

## 2018-02-12 RX ADMIN — POTASSIUM CHLORIDE SCH MLS/HR: 14.9 INJECTION, SOLUTION INTRAVENOUS at 22:49

## 2018-02-12 RX ADMIN — HEPARIN SODIUM SCH MLS/HR: 5000 INJECTION, SOLUTION INTRAVENOUS at 23:19

## 2018-02-12 RX ADMIN — SODIUM CHLORIDE SCH MLS/HR: 234 INJECTION INTRAMUSCULAR; INTRAVENOUS; SUBCUTANEOUS at 08:52

## 2018-02-12 RX ADMIN — CHLORHEXIDINE GLUCONATE SCH ML: 1.2 RINSE ORAL at 21:27

## 2018-02-12 RX ADMIN — CEFAZOLIN SCH MLS/HR: 330 INJECTION, POWDER, FOR SOLUTION INTRAMUSCULAR; INTRAVENOUS at 03:30

## 2018-02-12 RX ADMIN — CEFAZOLIN SCH MLS/HR: 330 INJECTION, POWDER, FOR SOLUTION INTRAMUSCULAR; INTRAVENOUS at 03:20

## 2018-02-12 RX ADMIN — CEFAZOLIN SCH MLS/HR: 330 INJECTION, POWDER, FOR SOLUTION INTRAMUSCULAR; INTRAVENOUS at 03:31

## 2018-02-12 RX ADMIN — DEXTROSE MONOHYDRATE SCH MLS/HR: 5 INJECTION, SOLUTION INTRAVENOUS at 10:56

## 2018-02-12 RX ADMIN — POTASSIUM CHLORIDE SCH MLS/HR: 14.9 INJECTION, SOLUTION INTRAVENOUS at 03:19

## 2018-02-12 RX ADMIN — POTASSIUM CHLORIDE SCH MLS/HR: 14.9 INJECTION, SOLUTION INTRAVENOUS at 15:17

## 2018-02-12 RX ADMIN — DILTIAZEM HYDROCHLORIDE ONE MLS/HR: 5 INJECTION INTRAVENOUS at 01:01

## 2018-02-12 RX ADMIN — MAGNESIUM SULFATE IN DEXTROSE SCH MLS/HR: 10 INJECTION, SOLUTION INTRAVENOUS at 08:52

## 2018-02-12 RX ADMIN — PANTOPRAZOLE SODIUM SCH MG: 40 INJECTION, POWDER, FOR SOLUTION INTRAVENOUS at 08:50

## 2018-02-12 RX ADMIN — CEFAZOLIN SCH MLS/HR: 330 INJECTION, POWDER, FOR SOLUTION INTRAMUSCULAR; INTRAVENOUS at 21:30

## 2018-02-12 RX ADMIN — DIGOXIN SCH MCG: 0.25 INJECTION INTRAMUSCULAR; INTRAVENOUS at 11:23

## 2018-02-12 RX ADMIN — Medication SCH MLS/HR: at 23:19

## 2018-02-12 RX ADMIN — Medication SCH MLS/HR: at 16:45

## 2018-02-12 RX ADMIN — MAGNESIUM SULFATE IN DEXTROSE SCH MLS/HR: 10 INJECTION, SOLUTION INTRAVENOUS at 10:08

## 2018-02-12 RX ADMIN — CHLORHEXIDINE GLUCONATE SCH ML: 1.2 RINSE ORAL at 08:50

## 2018-02-12 RX ADMIN — SODIUM CHLORIDE SCH: 900 INJECTION, SOLUTION INTRAVENOUS at 18:39

## 2018-02-12 RX ADMIN — Medication SCH MLS/HR: at 11:23

## 2018-02-12 RX ADMIN — DEXTROSE MONOHYDRATE SCH MLS/HR: 5 INJECTION, SOLUTION INTRAVENOUS at 16:25

## 2018-02-12 RX ADMIN — CEFAZOLIN SCH MLS/HR: 330 INJECTION, POWDER, FOR SOLUTION INTRAMUSCULAR; INTRAVENOUS at 03:29

## 2018-02-12 RX ADMIN — SODIUM CHLORIDE SCH MLS/HR: 234 INJECTION INTRAMUSCULAR; INTRAVENOUS; SUBCUTANEOUS at 21:27

## 2018-02-12 RX ADMIN — CEFAZOLIN SCH MLS/HR: 330 INJECTION, POWDER, FOR SOLUTION INTRAMUSCULAR; INTRAVENOUS at 15:17

## 2018-02-12 NOTE — P.CNPUL
History of Present Illness


Consult date: 02/12/18


Reason for consult: other


Chief complaint: Mental status changes, respiratory failure, sepsis, chronic 

liver disease


History of present illness: 





Consult dated 02/12/2018





This is a 62-year-old female who was apparently evaluated in the emergency 

room.  The patient apparently is a heavy drinker is been drinking up until the 

time of admission.  EMS was called.  The patient was lying in bed soaked in her 

own urine.  She apparently was very weak and fatigue.  The patient apparently 

was recently treated for urinary tract infection at home with amoxicillin.  The 

patient had empty alcohol bottles strength throughout the house.  Anyway, she 

was being admitted to the ICU because of her alcohol abuse and possible sepsis 

with possible pneumonia and new onset atrial fibrillation.  Subsequent to be 

talking to the ER doctor, the patient apparently developed agonal respirations 

and was intubated.  I do nothing about that.  The patient was since then up 

into the ICU.  I was supposed notify the patient was in the ICU on the 

ventilator by Kirk, the nighttime nurse.  Needless to say I was upset about 

this and I did call the ER doctor to let him know that this should never 

happened.  He apologized.  We moved on.  Anyway, currently the patient's in 

dire straits.  I did have a long conversation with the family.  I spent more 

than 40 minutes with this family and patient thus far.





The patient's currently on the assist control mode with a rate of 26 a tidal 

volume of 350 and FiO2 of 100% and PEEP of 5.  Arterial blood gases show a PaO2 

of 76 a PaCO2 of 55 and a pH of 7.10.  The patient's on a dextrose IV with 3 

amps of sodium bicarbonate at 1 25 mL an hour, norepinephrine, and 50 mcg/m, 

vasopressin at 0.03 units per minute, heparin via weightbase protocol for the 

atrial fibrillation propofol at 10 mics per kilogram per minute and MVI at 100 

mL an hour.  After talking to the daughter Guerline, who is a nurse at [employer], they want to give the patient a bit more time.  Apparently some 

outside family members have to come in.





Review of Systems


ROS unobtainable: due to endotracheal tube





Past Medical History


Past Medical History: No Reported History


Additional Past Medical History / Comment(s): Arthritis


History of Any Multi-Drug Resistant Organisms: None Reported


Additional Past Surgical History / Comment(s): Lt fx


Past Anesthesia/Blood Transfusion Reactions: No Reported Reaction


Past Psychological History: No Psychological Hx Reported


Smoking Status: Current every day smoker


Past Alcohol Use History: Heavy


Past Drug Use History: None Reported





Medications and Allergies


 Home Medications











 Medication  Instructions  Recorded  Confirmed  Type


 


Tolterodine Tartrate [Detrol LA] 2 mg PO DAILY 02/11/18 02/11/18 History


 


Vitamin B Complex 1 cap PO DAILY 02/11/18 02/11/18 History











 Allergies











Allergy/AdvReac Type Severity Reaction Status Date / Time


 


No Known Allergies Allergy   Verified 02/11/18 15:51














Physical Exam


Osteopathic Statement: *.  No significant issues noted on an osteopathic 

structural exam other than those noted in the History and Physical/Consult.


Vitals: 


 Vital Signs











  Temp Pulse Pulse Resp BP Pulse Ox


 


 02/12/18 09:45   160 H   26 H  96/65  90 L


 


 02/12/18 09:30   132 H   26 H  96/65  79 L


 


 02/12/18 09:15   142 H   26 H  96/65  80 L


 


 02/12/18 09:00   155 H   27 H  96/65  84 L


 


 02/12/18 08:45   136 H   27 H  96/65  78 L


 


 02/12/18 08:30   146 H   26 H  96/65  78 L


 


 02/12/18 08:15   137 H   26 H  96/65  77 L


 


 02/12/18 08:00  102 F H  146 H   28 H  96/65  79 L


 


 02/12/18 07:45   159 H   30 H  112/82  76 L


 


 02/12/18 07:30   134 H   26 H  112/82  74 L


 


 02/12/18 07:15   152 H   29 H  106/71  77 L


 


 02/12/18 07:00   137 H   28 H  109/72  90 L


 


 02/12/18 06:45   137 H   27 H  110/59  90 L


 


 02/12/18 06:30   127 H   38 H  119/74  90 L


 


 02/12/18 06:15   139 H   31 H  126/67  95


 


 02/12/18 06:00   139 H   30 H  112/94  90 L


 


 02/12/18 05:45   127 H   30 H  114/75  98


 


 02/12/18 05:30   142 H   31 H  126/67  92 L


 


 02/12/18 05:15   133 H   38 H  120/75  91 L


 


 02/12/18 05:00   8 L   33 H  110/76  89 L


 


 02/12/18 04:45   56 L   31 H  95/78  91 L


 


 02/12/18 04:30   78   28 H  96/65  92 L


 


 02/12/18 04:15   121 H   35 H  109/68  92 L


 


 02/12/18 04:00  100 F H  102 H  104 H  31 H  109/67  92 L


 


 02/12/18 03:45   126 H   31 H  108/56  92 L


 


 02/12/18 03:30   100   29 H  107/55  93 L


 


 02/12/18 03:15   134 H   26 H  92/63  93 L


 


 02/12/18 03:00   113 H   26 H  87/58  93 L


 


 02/12/18 02:45   115 H   33 H  104/52  93 L


 


 02/12/18 02:30   115 H   37 H  93/62  88 L


 


 02/12/18 02:15   94   32 H  92/51  87 L


 


 02/12/18 02:00   97   28 H  87/50  88 L


 


 02/12/18 01:45   55 L   28 H  83/49  89 L


 


 02/12/18 01:30   98   33 H  90/54  88 L


 


 02/12/18 01:15   115 H   25 H  90/53  89 L


 


 02/12/18 01:00   111 H   31 H  96/53  90 L


 


 02/12/18 00:45   98   30 H  82/50  91 L


 


 02/12/18 00:30   109 H   30 H  86/58  91 L


 


 02/12/18 00:15   108 H   27 H  96/57  91 L


 


 02/12/18 00:00  98.4 F  110 H  104 H  22  90/41  92 L


 


 02/11/18 23:45   111 H   22  95/54  94 L


 


 02/11/18 23:30   118 H   22  86/52  94 L


 


 02/11/18 23:15   96   22  94/87  94 L


 


 02/11/18 23:00   103 H   22  92/51  97


 


 02/11/18 22:45   101 H   22  82/60  97


 


 02/11/18 22:30   120 H   22  93/45  97


 


 02/11/18 22:15   113 H   22  87/46  96


 


 02/11/18 22:00   98   22  93/53  92 L


 


 02/11/18 21:45   115 H   22  81/47  97


 


 02/11/18 21:30   116 H   22  83/51  97


 


 02/11/18 21:15   118 H   22  87/49  96


 


 02/11/18 21:00   114 H   22  82/54  97


 


 02/11/18 20:45   121 H   17  92/50  93 L


 


 02/11/18 20:44   113 H   20  92/50  92 L


 


 02/11/18 20:30   125 H   21  88/46  92 L


 


 02/11/18 20:15   138 H   27 H  82/51  94 L


 


 02/11/18 20:00   123 H  124 H  14  106/62  93 L


 


 02/11/18 19:45  98.6 F  139 H   24  89/59  94 L


 


 02/11/18 19:09   135 H    128/60 


 


 02/11/18 19:05   158 H    113/71 


 


 02/11/18 18:50   150 H    149/70 


 


 02/11/18 18:36   156 H    176/86 


 


 02/11/18 18:09   165 H    86/59 


 


 02/11/18 17:44      88/52 


 


 02/11/18 17:30  98.6 F   124 H  14   91 L


 


 02/11/18 16:42   146 H   120 H  88/57  90 L


 


 02/11/18 16:21   139 H   20  81/51  91 L


 


 02/11/18 16:06   134 H   18  84/53 


 


 02/11/18 15:24  98.7 F  158 H   18  93/55 


 


 02/11/18 14:55   139 H   16  89/52 


 


 02/11/18 14:45   156 H   18  88/57 


 


 02/11/18 14:35   161 H   18  94/59 


 


 02/11/18 14:15    166 H   


 


 02/11/18 14:13   144 H   16  91/54 


 


 02/11/18 14:05  98.4 F  166 H   98 H  102/61  98








 Intake and Output











 02/11/18 02/12/18 02/12/18





 22:59 06:59 14:59


 


Intake Total 3213.137 3641.929 923.713


 


Output Total 0 63 55


 


Balance 3213.137 3578.929 868.713


 


Intake:   


 


  IV 3025.0 3425.0 775


 


    0.9 3000 2400 


 


    Calcium Gluconate 1,000   100





    mg In Sodium Chloride 0.9   





    % 100 ml @ 100 mls/hr   





    IVPB BID [facility] Rx#:   





    [id number]   


 


    Dextrose 5%-0.45% NaCl 1,  500 375





    000 ml @ 125 mls/hr IV .   





    Q9H12M CLEM with Sodium   





    Bicarb (1 Meq/ml) 150 ml   





    Rx#:[id number]   


 


    Magnesium Sulfate-D5w Pmx   100





    1 gm In Dextrose/Water 1   





    100ml.bag @ 100 mls/hr   





    IVPB Q1H CLEM Rx#:   





    [id number]   


 


    Piperacillin-Tazobactam 3 25.0 25.0 





    .375 gm In Dextrose/Water   





    1 50ml.bag @ 12.5 mls/hr   





    IVPB ONCE STA Rx#:   





    [id number]   


 


    Sodium Chloride 0.9% 1,  500 200





    000 ml @ 100 mls/hr IV .   





    Q10H7M ONE with Mvi,   





    Adult No.4 with Vit K 10   





    ml with Thiamine 100 mg   





    with Folic Acid 1 mg Rx#:   





    [id number]   


 


  Intake, IV Titration 188.137 216.929 148.713





  Amount   


 


    Diltiazem 125 mg In 39.75 98.083 





    Sodium Chloride 0.9% 100   





    ml @ 5 MG/HR 5 mls/hr IV   





    .Q24H ONE Rx#:[id number]   


 


    Heparin Sod,Pork in 0.45% 28.543  148.713





    NaCl 25,000 unit In 0.45   





    % NaCl 1 500ml.bag @ 12   





    UNITS/KG/HR 12.41 mls/hr   





    IV .Q24H [facility] Rx#:   





    [id number]   


 


    Norepinephrin 16 mg-0.9%  77.813 





    Ns Pmx 16 mg In 250 ml @   





    Titrate IV .Q0M [facility] Rx#:   





    [id number]   


 


    Norepinephrin 4 mg-0.9% 119.844  





    Ns Pmx 4 mg In 250 ml @   





    Titrate IV .Q0M [facility] Rx#:   





    [id number]   


 


    Propofol 1,000 mg In  41.033 





    Empty Bag 1 bag @ Titrate   





    IV .Q0M [facility] Rx#:   





    [id number]   


 


Output:   


 


  Urine 0 63 55


 


Other:   


 


  Voiding Method Indwelling Catheter Indwelling Catheter 


 


  Weight 53.8 kg  








 ABP, PAP, CO, CI - Last 8 Hours











Arterial Blood Pressure        88/55


 


Arterial Blood Pressure        79/51


 


Arterial Blood Pressure        84/54


 


Arterial Blood Pressure        82/48


 


Arterial Blood Pressure        74/49


 


Arterial Blood Pressure        79/58


 


Arterial Blood Pressure        81/55


 


Arterial Blood Pressure        87/60


 


Arterial Blood Pressure        85/55


 


Arterial Blood Pressure        90/59


 


Arterial Blood Pressure        89/64


 


Arterial Blood Pressure        93/63


 


Arterial Blood Pressure        95/65


 


Arterial Blood Pressure        106/72


 


Arterial Blood Pressure        88/62


 


Arterial Blood Pressure        97/65


 


Arterial Blood Pressure        95/60


 


Arterial Blood Pressure        100/68


 


Arterial Blood Pressure        98/68


 


Arterial Blood Pressure        95/60


 


Arterial Blood Pressure        90/64


 


Arterial Blood Pressure        96/61


 


Arterial Blood Pressure        96/61


 


Arterial Blood Pressure        94/66


 


Arterial Blood Pressure        94/63


 


Arterial Blood Pressure        84/53


 


Arterial Blood Pressure        79/55


 


Arterial Blood Pressure        84/59


 


Arterial Blood Pressure        82/54


 


Arterial Blood Pressure        85/54

















The patient's unresponsive.  The patient has an orally placed endotracheal tube 

and NG tube.





HEENT examination is grossly unremarkable.  Mucous membranes are dry.  No oral 

lesions.





Neck supple.  Full range of motion.  No adenopathy thyromegaly or neck vein 

distention.





Cardiovascular examination reveals irregular rhythm rate.  S1-S2 normal.  No S3 

or S4.  No discernible murmur noted.  Heart rate about 125.





Lungs reveal coarse expiratory rhonchi.  Breath sounds are diminished.  There 

are bibasilar crackles.  Breath sounds are diminished throughout.





Abdomen soft bowel sounds are heard.  No masses or tenderness.





Extremities are intact.  No cyanosis clubbing or edema.





Skin is without rash or lesion.





Neurologic examination could not be adequately assessed





Results





- Laboratory Findings


CBC and BMP: 


 02/12/18 04:16





 02/12/18 04:16


ABG











ABG pH  7.10  (7.35-7.45)  L*  02/12/18  09:13    


 


ABG pCO2  55 mmHg (35-45)  H  02/12/18  09:13    


 


ABG pO2  76 mmHg ()  L  02/12/18  09:13    


 


ABG O2 Saturation  93.4 % (94-97)  L  02/12/18  09:13    





PT/INR, D-dimer











PT  10.5 sec (9.0-12.0)   02/12/18  04:16    


 


INR  1.1  (<1.2)   02/12/18  04:16    








Abnormal lab findings: 


 Abnormal Labs











  02/11/18 02/11/18 02/11/18





  14:00 14:00 14:00


 


WBC   


 


Hgb  16.6 H  


 


Hct  50.5 H  


 


MCV  101.2 H  


 


MCHC   


 


Plt Count   


 


Neutrophils # (Manual)  10.00 H  


 


Lymphocytes # (Manual)  0.53 L  


 


Nucleated RBCs   


 


APTT   


 


ABG pH   


 


ABG pCO2   


 


ABG pO2   


 


ABG HCO3   


 


ABG Total CO2   


 


ABG O2 Saturation   


 


ABG Lactic Acid   


 


Sodium   134 L 


 


Chloride   91 L 


 


Carbon Dioxide   


 


BUN   104 H* 


 


Creatinine   1.30 H 


 


POC Glucose (mg/dL)   


 


Plasma Lactic Acid Cam    4.5 H*


 


Calcium   


 


Total Bilirubin   2.1 H 


 


AST   115 H 


 


Total Protein   4.9 L 


 


Albumin   2.5 L 


 


Urine Appearance   


 


Urine Protein   


 


Urine Bilirubin   


 


Amorphous Sediment   


 


Hyaline Casts   


 


Urine Mucus   


 


U Marijuana (THC) Screen   














  02/11/18 02/11/18 02/11/18





  14:00 14:21 18:02


 


WBC   


 


Hgb   


 


Hct   


 


MCV   


 


MCHC   


 


Plt Count   


 


Neutrophils # (Manual)   


 


Lymphocytes # (Manual)   


 


Nucleated RBCs   


 


APTT   


 


ABG pH    7.13 L*


 


ABG pCO2    69 H


 


ABG pO2    59 L


 


ABG HCO3   


 


ABG Total CO2    25 H


 


ABG O2 Saturation    79.9 L


 


ABG Lactic Acid   


 


Sodium   


 


Chloride   


 


Carbon Dioxide   


 


BUN   


 


Creatinine   


 


POC Glucose (mg/dL)   


 


Plasma Lactic Acid Cam   


 


Calcium   


 


Total Bilirubin   


 


AST   


 


Total Protein   


 


Albumin   


 


Urine Appearance  Cloudy H  


 


Urine Protein  1+ H  


 


Urine Bilirubin  1+ H  


 


Amorphous Sediment  Rare H  


 


Hyaline Casts  125 H  


 


Urine Mucus  Rare H  


 


U Marijuana (THC) Screen   Detected H 














  02/11/18 02/11/18 02/11/18





  19:37 20:11 20:13


 


WBC   


 


Hgb   


 


Hct   


 


MCV   


 


MCHC   


 


Plt Count   


 


Neutrophils # (Manual)   


 


Lymphocytes # (Manual)   


 


Nucleated RBCs   


 


APTT   


 


ABG pH   


 


ABG pCO2   


 


ABG pO2   


 


ABG HCO3   


 


ABG Total CO2   


 


ABG O2 Saturation   


 


ABG Lactic Acid   


 


Sodium   


 


Chloride   


 


Carbon Dioxide   


 


BUN   


 


Creatinine   


 


POC Glucose (mg/dL)  60 L   105 H


 


Plasma Lactic Acid Cam   3.0 H* 


 


Calcium   


 


Total Bilirubin   


 


AST   


 


Total Protein   


 


Albumin   


 


Urine Appearance   


 


Urine Protein   


 


Urine Bilirubin   


 


Amorphous Sediment   


 


Hyaline Casts   


 


Urine Mucus   


 


U Marijuana (THC) Screen   














  02/11/18 02/12/18 02/12/18





  20:38 02:17 04:16


 


WBC    14.1 H


 


Hgb   


 


Hct   


 


MCV    107.7 H D


 


MCHC    30.1 L


 


Plt Count    138 L


 


Neutrophils # (Manual)    13.20 H


 


Lymphocytes # (Manual)    0.71 L


 


Nucleated RBCs    2 H


 


APTT   


 


ABG pH  7.16 L*  7.12 L* 


 


ABG pCO2  58 H  53 H 


 


ABG pO2  82 L  75 L 


 


ABG HCO3   17 L 


 


ABG Total CO2   


 


ABG O2 Saturation  90.9 L  90.1 L 


 


ABG Lactic Acid   


 


Sodium   


 


Chloride   


 


Carbon Dioxide   


 


BUN   


 


Creatinine   


 


POC Glucose (mg/dL)   


 


Plasma Lactic Acid Cam   


 


Calcium   


 


Total Bilirubin   


 


AST   


 


Total Protein   


 


Albumin   


 


Urine Appearance   


 


Urine Protein   


 


Urine Bilirubin   


 


Amorphous Sediment   


 


Hyaline Casts   


 


Urine Mucus   


 


U Marijuana (THC) Screen   














  02/12/18 02/12/18 02/12/18





  04:16 04:16 04:16


 


WBC   


 


Hgb   


 


Hct   


 


MCV   


 


MCHC   


 


Plt Count   


 


Neutrophils # (Manual)   


 


Lymphocytes # (Manual)   


 


Nucleated RBCs   


 


APTT  30.4 H  


 


ABG pH   


 


ABG pCO2   


 


ABG pO2   


 


ABG HCO3   


 


ABG Total CO2   


 


ABG O2 Saturation   


 


ABG Lactic Acid    1.9 H


 


Sodium   


 


Chloride   114 H 


 


Carbon Dioxide   17 L 


 


BUN   74 H 


 


Creatinine   1.40 H 


 


POC Glucose (mg/dL)   


 


Plasma Lactic Acid Cam   


 


Calcium   5.9 L* 


 


Total Bilirubin   


 


AST   87 H 


 


Total Protein   3.1 L 


 


Albumin   1.4 L 


 


Urine Appearance   


 


Urine Protein   


 


Urine Bilirubin   


 


Amorphous Sediment   


 


Hyaline Casts   


 


Urine Mucus   


 


U Marijuana (THC) Screen   














  02/12/18





  09:13


 


WBC 


 


Hgb 


 


Hct 


 


MCV 


 


MCHC 


 


Plt Count 


 


Neutrophils # (Manual) 


 


Lymphocytes # (Manual) 


 


Nucleated RBCs 


 


APTT 


 


ABG pH  7.10 L*


 


ABG pCO2  55 H


 


ABG pO2  76 L


 


ABG HCO3  17 L


 


ABG Total CO2 


 


ABG O2 Saturation  93.4 L


 


ABG Lactic Acid 


 


Sodium 


 


Chloride 


 


Carbon Dioxide 


 


BUN 


 


Creatinine 


 


POC Glucose (mg/dL) 


 


Plasma Lactic Acid Cam 


 


Calcium 


 


Total Bilirubin 


 


AST 


 


Total Protein 


 


Albumin 


 


Urine Appearance 


 


Urine Protein 


 


Urine Bilirubin 


 


Amorphous Sediment 


 


Hyaline Casts 


 


Urine Mucus 


 


U Marijuana (THC) Screen 














- Diagnostic Findings


Chest x-ray: image reviewed (Labs x-rays a medications are all reviewed.)





Assessment and Plan


Assessment: 





Hypoxemic respiratory failure.





Septic shock





Bibasilar pneumonia





New onset atrial fibrillation with rapid ventricular response





Severe alcoholic liver disease and chronic alcohol abuse





Alcoholic cirrhosis





Profound hypotension





Severe metabolic acidosis








Plan: 





Plan dated 02/12/2018





I do long talk with the family.  This included adult daughter Guerline, who is a 

nurse and before.  They wish to give their mother a bit more time on the 

ventilator.  I think overall prognosis is very poor.  She is on quite a bit of 

support including mechanical ventilation and a number of medications to 

maintain blood pressure.  She develop new onset atrial fibrillation with RVR.  

Has a history of severe alcoholic liver disease and liver cirrhosis.  Currently 

on sodium bicarbonate drip norepinephrine vasopressin heparin and propofol. 


Time with Patient: Greater than 30

## 2018-02-12 NOTE — ECHOF
Referral Reason:afib rvr



MEASUREMENTS

--------

HEIGHT: 170.2 cm

WEIGHT: 53.5 kg

BP: 89/57

IVSd:   1.1 cm     (0.6 - 1.1)

LVIDd:   2.2 cm     (3.9 - 5.3)

LVPWd:   1.2 cm     (0.6 - 1.1)

IVSs:   1.6 cm

LVIDs:   1.2 cm

LVPWs:   1.7 cm

LAESV Index (A-L):   20.26 ml/m

Ao Diam:   3.2 cm     (2.0 - 3.7)

AV Cusp:   1.9 cm     (1.5 - 2.6)

LA Diam:   3.9 cm     (2.7 - 3.8)

MV EXCURSION:   20.043 mm     (> 18.000)

MV EF SLOPE:   204 mm/s     (70 - 150)

EPSS:   0.3 cm

RAP:   20.00 mmHg

RVSP:   49.52 mmHg







FINDINGS

--------

Atrial fibrillation.

This was a technically good study.

The left ventricular size is normal.   There is borderline concentric left ventricular hypertrophy.  
 Overall left ventricular systolic function is normal with, an EF between 55 - 60 %.

The right ventricle is normal in size and function.

The left atrium is normal in size.

The right atrium is normal in size.

Aortic valve is trileaflet and is mildly thickened.

The mitral valve leaflets are mildly thickened.   Mild mitral regurgitation is present.

Moderate to severe tricuspid regurgitation present.   There is mild to moderate pulmonary hypertensio
n.   The right ventricular systolic pressure, as measured by Doppler, is 49.52mmHg.

There is no pulmonic regurgitation present.

The aortic root size is normal.

The inferior vena cava is dilated with no significant inspiratory collapse which is consistent estima
betty right atrial pressure of >20 mmHg.

There is a trivial pericardial effusion present.



CONCLUSIONS

--------

1. Atrial fibrillation.

2. This was a technically good study.

3. The left ventricular size is normal.

4. There is borderline concentric left ventricular hypertrophy.

5. Overall left ventricular systolic function is normal with, an EF between 55 - 60 %.

6. The left atrium is normal in size.

7. Aortic valve is trileaflet and is mildly thickened.

8. The mitral valve leaflets are mildly thickened.

9. Mild mitral regurgitation is present.

10. Moderate to severe tricuspid regurgitation present.

11. There is mild to moderate pulmonary hypertension.

12. The right ventricular systolic pressure, as measured by Doppler, is 49.52mmHg.

13. There is no pulmonic regurgitation present.

14. The aortic root size is normal.

15. The inferior vena cava is dilated with no significant inspiratory collapse which is consistent es
timated right atrial pressure of >20 mmHg.

16. There is a trivial pericardial effusion present.





SONOGRAPHER: Basia Hodgson RDCS

## 2018-02-12 NOTE — XR
EXAMINATION TYPE: XR chest 1V portable

 

DATE OF EXAM: 2/12/2018

 

Comparison: 2/11/2018

 

Clinical History: 62-year-old female Tube placement

 

Findings:

Heart margins are obscured by adjacent pleural parenchymal opacities. ET tube satisfactory. NG tube c
ourses below the diaphragm. Right IJ CVC tip in lower SVC. Dense left mid and lower lung opacities an
d right basilar opacities persist. Densities are worsened from prior exam. Small effusions also suspe
cted.

 

 

 

Impression:

Worsening left greater than right bibasilar consolidation and effusions. Infiltrates on the left exte
nd up to the mid lung level.

## 2018-02-12 NOTE — PN
PROGRESS NOTE



DATE OF SERVICE:

This 62-year-old woman who was admitted with bilateral pneumonia, sepsis, and also

change in mental status, being closely monitored. The patient is mechanically intubated

because of hypoxic respiratory failure.  The patient has also elevated lactic acid.

Patient is on broad spectrum IV antibiotics.



PAST MEDICAL HISTORY:

Could not be taken.



PHYSICAL EXAM:

The patient is mechanically ventilated and sedated.  Pulse is 101, blood pressure is

82/60, respiration 20, temp is normal, pulse ox 97%, 100% FiO2.  Vent settings are

noted.

HEENT:  Conjunctivae normal.  Oral mucosa moist.

NECK: No jugular venous distention.  No lymph node enlargement.

CARDIOVASCULAR: S1 and S2. No S4.

RESPIRATORY: Breath sounds diminished in the bases. few scattered rhonchi and crackles.

ABDOMEN:  Soft, nontender.  No mass palpable.

LEGS: No edema or cyanosis.

NERVOUS SYSTEM: Mechanically ventilated and sedated.

SKIN: No ulcers or rashes.



LAB INVESTIGATIONS:

WBC 14.1, hemoglobin 13.2, .7. ABGs noted. Otherwise CO2 is 17. Calcium is 5.9.



ASSESSMENT:

1. Bilateral pneumonia with hypotension, sepsis, as well as acute hypoxic respiratory

    failure on mechanical ventilation.

2. Acute metabolic and respiratory acidosis.

3. Hyponatremia.

4. Acute renal failure.

5. Increased WBC.

6. History of degenerative joint disease.

7. History of nicotine dependence.

8. History of ETOH.

9. Hypocalcemia.

10.FULL CODE.



RECOMMENDATIONS:

This 62-year-old woman who presented with multiple complex medical issues, will monitor

the patient closely, continue the current management and symptomatic treatment.

Otherwise at this time I recommend to continue with broad-spectrum IV antibiotics.

Obtain cultures.  Mechanical ventilation per Dr. Logan.  Prognosis guarded because of

multiple complex medical issues.  Discussed at length with the family at the bedside

who understand and agree. Further recommendations to follow.





MMODL / IJN: [id number] / Job#: [id number]

## 2018-02-12 NOTE — P.CRDCN
History of Present Illness


Consult date: 02/12/18


History of present illness: 


This is a 62-year-old female with history of alcohol abuse who has been sick 

for a couple weeks and was admitted to the hospital with acute respiratory 

distress and bilateral pneumonia.  Patient is intubated.  Patient still seemed 

to be acidotic.  Blood cultures are positive.  We're asked to see the patient 

because of atrial fibrillation with a rapid ventricular response.  Patient is 

hypotensive requiring vasopressors.  Her heart rate is in the range of 120 to 

130.  She is intubated and sedated.  A chest x-ray shows bilateral pneumonia 

versus were getting worse.  Patient also has creatinine of about 1.3.  Patient 

is coming down.  I'm going to give 1 dose of IV Lanoxin.  Her potassium level 

is normal.  May give additional dose of Lanoxin as needed.  Once her blood 

pressure stable we'll  add beta blockers.  Echo Cardigan showed normal LV 

function








Review of Systems





Not obtained





Past Medical History


Past Medical History: No Reported History


Additional Past Medical History / Comment(s): Arthritis


History of Any Multi-Drug Resistant Organisms: None Reported


Additional Past Surgical History / Comment(s): Lt fx


Past Anesthesia/Blood Transfusion Reactions: No Reported Reaction


Past Psychological History: No Psychological Hx Reported


Smoking Status: Current every day smoker


Past Alcohol Use History: Heavy


Past Drug Use History: None Reported





Medications and Allergies


 Home Medications











 Medication  Instructions  Recorded  Confirmed  Type


 


Tolterodine Tartrate [Detrol LA] 2 mg PO DAILY 02/11/18 02/11/18 History


 


Vitamin B Complex 1 cap PO DAILY 02/11/18 02/11/18 History











 Allergies











Allergy/AdvReac Type Severity Reaction Status Date / Time


 


No Known Allergies Allergy   Verified 02/11/18 15:51














Physical Exam


Vitals: 


 Vital Signs











  Temp Pulse Pulse Resp BP Pulse Ox


 


 02/12/18 09:45   160 H   26 H  96/65  90 L


 


 02/12/18 09:30   132 H   26 H  96/65  79 L


 


 02/12/18 09:15   142 H   26 H  96/65  80 L


 


 02/12/18 09:00   155 H   27 H  96/65  84 L


 


 02/12/18 08:45   136 H   27 H  96/65  78 L


 


 02/12/18 08:30   146 H   26 H  96/65  78 L


 


 02/12/18 08:15   137 H   26 H  96/65  77 L


 


 02/12/18 08:00  102 F H  146 H   28 H  96/65  79 L


 


 02/12/18 07:45   159 H   30 H  112/82  76 L


 


 02/12/18 07:30   134 H   26 H  112/82  74 L


 


 02/12/18 07:15   152 H   29 H  106/71  77 L


 


 02/12/18 07:00   137 H   28 H  109/72  90 L


 


 02/12/18 06:45   137 H   27 H  110/59  90 L


 


 02/12/18 06:30   127 H   38 H  119/74  90 L


 


 02/12/18 06:15   139 H   31 H  126/67  95


 


 02/12/18 06:00   139 H   30 H  112/94  90 L


 


 02/12/18 05:45   127 H   30 H  114/75  98


 


 02/12/18 05:30   142 H   31 H  126/67  92 L


 


 02/12/18 05:15   133 H   38 H  120/75  91 L


 


 02/12/18 05:00   8 L   33 H  110/76  89 L


 


 02/12/18 04:45   56 L   31 H  95/78  91 L


 


 02/12/18 04:30   78   28 H  96/65  92 L


 


 02/12/18 04:15   121 H   35 H  109/68  92 L


 


 02/12/18 04:00  100 F H  102 H  104 H  31 H  109/67  92 L


 


 02/12/18 03:45   126 H   31 H  108/56  92 L


 


 02/12/18 03:30   100   29 H  107/55  93 L


 


 02/12/18 03:15   134 H   26 H  92/63  93 L


 


 02/12/18 03:00   113 H   26 H  87/58  93 L


 


 02/12/18 02:45   115 H   33 H  104/52  93 L


 


 02/12/18 02:30   115 H   37 H  93/62  88 L


 


 02/12/18 02:15   94   32 H  92/51  87 L


 


 02/12/18 02:00   97   28 H  87/50  88 L


 


 02/12/18 01:45   55 L   28 H  83/49  89 L


 


 02/12/18 01:30   98   33 H  90/54  88 L


 


 02/12/18 01:15   115 H   25 H  90/53  89 L


 


 02/12/18 01:00   111 H   31 H  96/53  90 L


 


 02/12/18 00:45   98   30 H  82/50  91 L


 


 02/12/18 00:30   109 H   30 H  86/58  91 L


 


 02/12/18 00:15   108 H   27 H  96/57  91 L


 


 02/12/18 00:00  98.4 F  110 H  104 H  22  90/41  92 L


 


 02/11/18 23:45   111 H   22  95/54  94 L


 


 02/11/18 23:30   118 H   22  86/52  94 L


 


 02/11/18 23:15   96   22  94/87  94 L


 


 02/11/18 23:00   103 H   22  92/51  97


 


 02/11/18 22:45   101 H   22  82/60  97


 


 02/11/18 22:30   120 H   22  93/45  97


 


 02/11/18 22:15   113 H   22  87/46  96


 


 02/11/18 22:00   98   22  93/53  92 L


 


 02/11/18 21:45   115 H   22  81/47  97


 


 02/11/18 21:30   116 H   22  83/51  97


 


 02/11/18 21:15   118 H   22  87/49  96


 


 02/11/18 21:00   114 H   22  82/54  97


 


 02/11/18 20:45   121 H   17  92/50  93 L


 


 02/11/18 20:44   113 H   20  92/50  92 L


 


 02/11/18 20:30   125 H   21  88/46  92 L


 


 02/11/18 20:15   138 H   27 H  82/51  94 L


 


 02/11/18 20:00   123 H  124 H  14  106/62  93 L


 


 02/11/18 19:45  98.6 F  139 H   24  89/59  94 L


 


 02/11/18 19:09   135 H    128/60 


 


 02/11/18 19:05   158 H    113/71 


 


 02/11/18 18:50   150 H    149/70 


 


 02/11/18 18:36   156 H    176/86 


 


 02/11/18 18:09   165 H    86/59 


 


 02/11/18 17:44      88/52 


 


 02/11/18 17:30  98.6 F   124 H  14   91 L


 


 02/11/18 16:42   146 H   120 H  88/57  90 L


 


 02/11/18 16:21   139 H   20  81/51  91 L


 


 02/11/18 16:06   134 H   18  84/53 


 


 02/11/18 15:24  98.7 F  158 H   18  93/55 


 


 02/11/18 14:55   139 H   16  89/52 


 


 02/11/18 14:45   156 H   18  88/57 


 


 02/11/18 14:35   161 H   18  94/59 


 


 02/11/18 14:15    166 H   


 


 02/11/18 14:13   144 H   16  91/54 


 


 02/11/18 14:05  98.4 F  166 H   98 H  102/61  98








 Intake and Output











 02/11/18 02/12/18 02/12/18





 22:59 06:59 14:59


 


Intake Total 3213.137 3641.929 923.713


 


Output Total 0 63 55


 


Balance 3213.137 3578.929 868.713


 


Intake:   


 


  IV 3025.0 3425.0 775


 


    0.9 3000 2400 


 


    Calcium Gluconate 1,000   100





    mg In Sodium Chloride 0.9   





    % 100 ml @ 100 mls/hr   





    IVPB BID CLEM Rx#:   





    [id number]   


 


    Dextrose 5%-0.45% NaCl 1,  500 375





    000 ml @ 125 mls/hr IV .   





    Q9H12M CLEM with Sodium   





    Bicarb (1 Meq/ml) 150 ml   





    Rx#:[id number]   


 


    Magnesium Sulfate-D5w Pmx   100





    1 gm In Dextrose/Water 1   





    100ml.bag @ 100 mls/hr   





    IVPB Q1H CLEM Rx#:   





    [id number]   


 


    Piperacillin-Tazobactam 3 25.0 25.0 





    .375 gm In Dextrose/Water   





    1 50ml.bag @ 12.5 mls/hr   





    IVPB ONCE STA Rx#:   





    [id number]   


 


    Sodium Chloride 0.9% 1,  500 200





    000 ml @ 100 mls/hr IV .   





    Q10H7M ONE with Mvi,   





    Adult No.4 with Vit K 10   





    ml with Thiamine 100 mg   





    with Folic Acid 1 mg Rx#:   





    [id number]   


 


  Intake, IV Titration 188.137 216.929 148.713





  Amount   


 


    Diltiazem 125 mg In 39.75 98.083 





    Sodium Chloride 0.9% 100   





    ml @ 5 MG/HR 5 mls/hr IV   





    .Q24H ONE Rx#:[id number]   


 


    Heparin Sod,Pork in 0.45% 28.543  148.713





    NaCl 25,000 unit In 0.45   





    % NaCl 1 500ml.bag @ 12   





    UNITS/KG/HR 12.41 mls/hr   





    IV .Q24H CLEM Rx#:   





    [id number]   


 


    Norepinephrin 16 mg-0.9%  77.813 





    Ns Pmx 16 mg In 250 ml @   





    Titrate IV .Q0M CLEM Rx#:   





    [id number]   


 


    Norepinephrin 4 mg-0.9% 119.844  





    Ns Pmx 4 mg In 250 ml @   





    Titrate IV .Q0M CLEM Rx#:   





    [id number]   


 


    Propofol 1,000 mg In  41.033 





    Empty Bag 1 bag @ Titrate   





    IV .Q0M CLEM Rx#:   





    [id number]   


 


Output:   


 


  Urine 0 63 55


 


Other:   


 


  Voiding Method Indwelling Catheter Indwelling Catheter Indwelling Catheter


 


  Weight 53.8 kg  








 ABP, PAP, CO, CI - Last 8 Hours











Arterial Blood Pressure        88/55


 


Arterial Blood Pressure        79/51


 


Arterial Blood Pressure        84/54


 


Arterial Blood Pressure        82/48


 


Arterial Blood Pressure        74/49


 


Arterial Blood Pressure        79/58


 


Arterial Blood Pressure        81/55


 


Arterial Blood Pressure        87/60


 


Arterial Blood Pressure        85/55


 


Arterial Blood Pressure        90/59


 


Arterial Blood Pressure        89/64


 


Arterial Blood Pressure        93/63


 


Arterial Blood Pressure        95/65


 


Arterial Blood Pressure        106/72


 


Arterial Blood Pressure        88/62


 


Arterial Blood Pressure        97/65


 


Arterial Blood Pressure        95/60


 


Arterial Blood Pressure        100/68


 


Arterial Blood Pressure        98/68


 


Arterial Blood Pressure        95/60


 


Arterial Blood Pressure        90/64


 


Arterial Blood Pressure        96/61


 


Arterial Blood Pressure        96/61


 


Arterial Blood Pressure        94/66


 


Arterial Blood Pressure        94/63


 


Arterial Blood Pressure        84/53


 


Arterial Blood Pressure        79/55


 


Arterial Blood Pressure        84/59


 


Arterial Blood Pressure        82/54

















GENERAL EXAM: Patient is debated and sedated


HEENT: Normocephalic. Normal reaction of pupils, equal size, normal range of 

extraocular motion. No erythema or exudates in the throat.


NECK: No masses, no nuchal rigidity.


CHEST: No chest wall deformity.


LUNGS: Bilateral rhonchi, rales


HEART: S1 and S2 normal.  Irregular heart sounds


ABDOMEN: Soft.


SKIN: No rashes


CENTRAL NERVOUS SYSTEM: Deferred


EXTREMITIES: No cyanosis, clubbing or edema.





Results





 02/12/18 04:16





 02/12/18 04:16


 Cardiac Enzymes











  02/11/18 02/11/18 02/12/18 Range/Units





  14:00 14:00 04:16 


 


AST  115 H   87 H  (14-36)  U/L


 


Troponin I   0.024   (0.000-0.034)  ng/mL








 Coagulation











  02/11/18 02/12/18 Range/Units





  14:00 04:16 


 


PT  10.1  10.5  (9.0-12.0)  sec


 


APTT  22.8  30.4 H  (22.0-30.0)  sec








 CBC











  02/11/18 02/12/18 Range/Units





  14:00 04:16 


 


WBC  10.6  14.1 H  (3.8-10.6)  k/uL


 


RBC  4.99  4.26  (3.80-5.40)  m/uL


 


Hgb  16.6 H  13.8  (11.4-16.0)  gm/dL


 


Hct  50.5 H  45.9  (34.0-46.0)  %


 


Plt Count  213  138 L  (150-450)  k/uL








 Comprehensive Metabolic Panel











  02/11/18 02/12/18 Range/Units





  14:00 04:16 


 


Sodium  134 L  140  (137-145)  mmol/L


 


Potassium  4.2  3.7  (3.5-5.1)  mmol/L


 


Chloride  91 L  114 H  ()  mmol/L


 


Carbon Dioxide  28  17 L  (22-30)  mmol/L


 


BUN  104 H*  74 H  (7-17)  mg/dL


 


Creatinine  1.30 H  1.40 H  (0.52-1.04)  mg/dL


 


Glucose  88  94  (74-99)  mg/dL


 


Calcium  9.3  5.9 L*  (8.4-10.2)  mg/dL


 


AST  115 H  87 H  (14-36)  U/L


 


ALT  39  34  (9-52)  U/L


 


Alkaline Phosphatase  63  39  ()  U/L


 


Total Protein  4.9 L  3.1 L  (6.3-8.2)  g/dL


 


Albumin  2.5 L  1.4 L  (3.5-5.0)  g/dL








 Current Medications











Generic Name Dose Route Start Last Admin





  Trade Name Freq  PRN Reason Stop Dose Admin


 


Chlorhexidine Gluconate  15 ml  02/12/18 09:00  02/12/18 08:50





  Peridex  MUCOUS MEM   15 ml





  BID CLEM   Administration


 


Heparin Sodium (Porcine)  0 unit  02/12/18 07:56  





  Heparin  IV   





  PER PROTOCOL PRN   





  Low PTT   





  Protocol   


 


Diltiazem HCl 125 mg/ Sodium  125 mls @ 5 mls/hr  02/11/18 14:02  02/12/18 02:30





  Chloride  IV  02/12/18 14:01  0 mg/hr





  .Q24H ONE   0 mls/hr





  Protocol   Titration





  5 MG/HR   


 


Heparin Sodium/Sodium Chloride  500 mls @ 12.41 mls/hr  02/11/18 16:15  02/12/ 18 07:47





  25,000 unit/ Sodium Chloride  IV   15 units/kg/hr





  .Q24H CLEM   15.51 mls/hr





  Protocol   Titration





  12 UNITS/KG/HR   


 


Fentanyl Citrate 2,500 mcg/  250 mls @ 2 mls/hr  02/11/18 18:15  02/11/18 21:59





  Sodium Chloride  IV   Not Given





  .Q24H CLEM   





  20 MCG/HR   


 


Norepinephrine Bitartrate  16 mg in 250 mls @ 0 mls/hr  02/11/18 22:00  02/12/ 18 02:00





  Levophed-0.9% Nacl 16 Mg/250ml Pmx  IV   50 mcg/min





  .Q0M CLEM   46.875 mls/hr





  Protocol   Titration





  Titrate   


 


Propofol 1,000 mg/ IV Solution  100 mls @ 0 mls/hr  02/11/18 22:00  02/12/18 02:

00





  IV   10.22 mcg/kg/min





  .Q0M CLEM   3.3 mls/hr





  Protocol   Titration





  Titrate   


 


Sodium Bicarbonate 150 ml/  1,150 mls @ 125 mls/hr  02/12/18 04:00  02/12/18 03:

19





  Dextrose/Sodium Chloride  IV   125 mls/hr





  .Q9H12M CLEM   Administration


 


Vasopressin 20 unit/ Sodium  100 mls @ 9 mls/hr  02/12/18 03:00  02/12/18 03:29





  Chloride  IV   9 mls/hr





  .Q11H7M CLEM   Administration





  0.03 UNITS/MIN   


 


Calcium Gluconate 1,000 mg/  110 mls @ 100 mls/hr  02/12/18 09:00  02/12/18 08:

52





  Sodium Chloride  IVPB   100 mls/hr





  BID CLEM   Administration


 


Piperacillin/Tazobactam/  50 mls @ 12.5 mls/hr  02/12/18 10:30  





  Dextrose 3.375 gm/ IV Solution  IVPB   





  Q8HR CLEM   


 


Naloxone HCl  0.2 mg  02/11/18 21:51  





  Narcan  IV   





  Q2M PRN   





  Opioid Reversal   


 


Pantoprazole Sodium  40 mg  02/12/18 09:00  02/12/18 08:50





  Protonix  IV   40 mg





  DAILY CLEM   Administration








 Intake and Output











 02/11/18 02/12/18 02/12/18





 22:59 06:59 14:59


 


Intake Total 3213.137 3641.929 923.713


 


Output Total 0 63 55


 


Balance 3213.137 3578.929 868.713


 


Intake:   


 


  IV 3025.0 3425.0 775


 


    0.9 3000 2400 


 


    Calcium Gluconate 1,000   100





    mg In Sodium Chloride 0.9   





    % 100 ml @ 100 mls/hr   





    IVPB BID CLEM Rx#:   





    [id number]   


 


    Dextrose 5%-0.45% NaCl 1,  500 375





    000 ml @ 125 mls/hr IV .   





    Q9H12M CLEM with Sodium   





    Bicarb (1 Meq/ml) 150 ml   





    Rx#:[id number]   


 


    Magnesium Sulfate-D5w Pmx   100





    1 gm In Dextrose/Water 1   





    100ml.bag @ 100 mls/hr   





    IVPB Q1H CLEM Rx#:   





    [id number]   


 


    Piperacillin-Tazobactam 3 25.0 25.0 





    .375 gm In Dextrose/Water   





    1 50ml.bag @ 12.5 mls/hr   





    IVPB ONCE STA Rx#:   





    [id number]   


 


    Sodium Chloride 0.9% 1,  500 200





    000 ml @ 100 mls/hr IV .   





    Q10H7M ONE with Mvi,   





    Adult No.4 with Vit K 10   





    ml with Thiamine 100 mg   





    with Folic Acid 1 mg Rx#:   





    [id number]   


 


  Intake, IV Titration 188.137 216.929 148.713





  Amount   


 


    Diltiazem 125 mg In 39.75 98.083 





    Sodium Chloride 0.9% 100   





    ml @ 5 MG/HR 5 mls/hr IV   





    .Q24H ONE Rx#:[id number]   


 


    Heparin Sod,Pork in 0.45% 28.543  148.713





    NaCl 25,000 unit In 0.45   





    % NaCl 1 500ml.bag @ 12   





    UNITS/KG/HR 12.41 mls/hr   





    IV .Q24H CLEM Rx#:   





    [id number]   


 


    Norepinephrin 16 mg-0.9%  77.813 





    Ns Pmx 16 mg In 250 ml @   





    Titrate IV .Q0M CLEM Rx#:   





    [id number]   


 


    Norepinephrin 4 mg-0.9% 119.844  





    Ns Pmx 4 mg In 250 ml @   





    Titrate IV .Q0M CLEM Rx#:   





    [id number]   


 


    Propofol 1,000 mg In  41.033 





    Empty Bag 1 bag @ Titrate   





    IV .Q0M CLEM Rx#:   





    [id number]   


 


Output:   


 


  Urine 0 63 55


 


Other:   


 


  Voiding Method Indwelling Catheter Indwelling Catheter Indwelling Catheter


 


  Weight 53.8 kg  








 





 02/12/18 04:16 





 02/12/18 04:16 











EKG Interpretations (text)





Atrial fibrillation with RVR





Assessment and Plan


(1) Atrial fibrillation with RVR


Current Visit: Yes   Status: Acute   Code(s): I48.91 - UNSPECIFIED ATRIAL 

FIBRILLATION   SNOMED Code(s): 546182218963593


   





(2) Septic shock


Current Visit: Yes   Status: Acute   Code(s): A41.9 - SEPSIS, UNSPECIFIED 

ORGANISM; R65.21 - SEVERE SEPSIS WITH SEPTIC SHOCK   SNOMED Code(s): 29343857


   





(3) Bilateral pneumonia


Current Visit: Yes   Status: Acute   Code(s): J18.9 - PNEUMONIA, UNSPECIFIED 

ORGANISM   SNOMED Code(s): 921922148


   





(4) History of alcohol abuse


Current Visit: Yes   Status: Acute   Code(s): Z87.898 - PERSONAL HISTORY OF 

OTHER SPECIFIED CONDITIONS   SNOMED Code(s): 928888949


   


Plan: 


This patient is admitted to bilateral pneumonia and sepsis.  Patient has A. fib 

with RVR.  Echocardiogram showed good LV function.  Patient is still 

hypotensive requiring vasopressors.  I'm going to one dose of Lanoxin today for 

rate control.

## 2018-02-12 NOTE — HP
HISTORY AND PHYSICAL



DATE OF SERVICE:

02/11/2018



CHIEF COMPLAINTS:

Weakness, change in mental status.



HISTORY OF PRESENT ILLNESS:

This 62-year-old woman with a past medical history of multiple medical problems

including history of DJD, history of left fracture, history of smoking being 
followed

by Dr. Tomas in the outpatient setting apparently had significant history of 
EtOH also.

The patient has been binge drinking almost like 5 days according to the family.
  The

patient has not seen and the patient apparently was lying in the bed and the 
patient

was living at home alone and because of increasing weakness, neighbor called 
the EMS

and the patient taken to [facility] and admitted to the hospital for 
further

evaluation and treatment.  The patient is obtunded at this time.



Unable to give a coherent history.  Most of the history, taken from my 
discussion with

the family and discussion with staff and review of chart and discussion with ER

physician.



PAST MEDICAL HISTORY:

History of DJD, history of left fracture, history of nicotine dependence.



MEDICATIONS:

Prior to admission include home medications are:

1. Detrol LA 2 mg p.o. daily.





ALLERGIES:

None.



Family history, social history and review of systems could not be taken because 
of the

patient's change in mental status.



PHYSICAL EXAM:

Patient is stuporous.  Pulse is 111, blood pressure 99/53, respiration 31, 
temperature

is normal.  Pulse ox 98% on 100%  nonbreather.  HEENT:  Conjunctivae normal.  
Oral

mucosa moist.  Neck is no jugular venous distention.  No carotid bruit. No 
lymph node

enlargement.  Cardiovascular:  S1, S2 muffled. No S3, no S4.  Respiratory: 
Breath

sounds diminished  in the bases.  Bilateral scattered rhonchi and crackles.  
ABDOMEN:

Soft, nontender.  No mass palpable.  Legs:  No edema and no swelling.

NERVOUS SYSTEM: Higher functions as mentioned earlier, moves all 4 limbs. No 
focal

deficits.  Lymphatics: No lymph nodes palpable in the neck, axillae or groin.  
Skin:

No ulcer, rash or bleeding.



LABS:

Yesterday's labs are WBC 10.2, hemoglobin 16.6, pH of 7.16 and plasma lactic 
acid 3.

Chest x-ray, bilateral pneumonia.



ASSESSMENT:

1. Bilateral pneumonia with severe sepsis and hypotension as well as acute 
hypoxic

    respiratory failure with acute metabolic acidosis, metabolic and respiratory

    acidosis, acute respiratory acidosis.

2. Change in mental status, metabolic encephalopathy.

3. History of ETOH.

4. Increased plasma lactic acid.

5. Increased MCV.

6. History of degenerative joint disease.

7. History of  fracture.



RECOMMENDATIONS AND DISCUSSION:

This 62-year-old woman who presented with multiple complex medical issues, we 
will

monitor the patient closely, continue the current medications, continue 
symptomatic

treatment.  Otherwise, I would recommend the patient be admitted in ICU. IV 
fluids,

four fluid boluses.  ABGs.  Closely monitor with Dr. Logan.  Continue with 
oxygenation

at this time.  The blood pressure is not coming, I  recommend Levophed drip and

continue broad-spectrum IV antibiotics.  Obtain cultures.  Prognosis extremely 
guarded

because of multiple complex medical issues which I discussed with the family who

understands and agrees.  Further recommendations to follow.





MMODL / IJN: [id number] / Job#: [id number]

AUTUMN

## 2018-02-13 VITALS — TEMPERATURE: 98.9 F

## 2018-02-13 VITALS — RESPIRATION RATE: 27 BRPM | HEART RATE: 160 BPM

## 2018-02-13 VITALS — DIASTOLIC BLOOD PRESSURE: 63 MMHG | SYSTOLIC BLOOD PRESSURE: 90 MMHG

## 2018-02-13 LAB
ANION GAP SERPL CALC-SCNC: 9 MMOL/L
BUN SERPL-SCNC: 75 MG/DL (ref 7–17)
CALCIUM SPEC-MCNC: 6.4 MG/DL (ref 8.4–10.2)
CELLS COUNTED: 200
CHLORIDE SERPL-SCNC: 113 MMOL/L (ref 98–107)
CO2 BLDA-SCNC: 23 MMOL/L (ref 19–24)
CO2 SERPL-SCNC: 22 MMOL/L (ref 22–30)
EOSINOPHIL # BLD MANUAL: 0.21 K/UL (ref 0–0.7)
ERYTHROCYTE [DISTWIDTH] IN BLOOD BY AUTOMATED COUNT: 4.24 M/UL (ref 3.8–5.4)
ERYTHROCYTE [DISTWIDTH] IN BLOOD: 14.1 % (ref 11.5–15.5)
GLUCOSE SERPL-MCNC: 141 MG/DL (ref 74–99)
HCO3 BLDA-SCNC: 21 MMOL/L (ref 21–25)
HCT VFR BLD AUTO: 45.3 % (ref 34–46)
HGB BLD-MCNC: 14 GM/DL (ref 11.4–16)
LYMPHOCYTES # BLD MANUAL: 1.07 K/UL (ref 1–4.8)
MAGNESIUM SPEC-SCNC: 2.1 MG/DL (ref 1.6–2.3)
MCH RBC QN AUTO: 32.9 PG (ref 25–35)
MCHC RBC AUTO-ENTMCNC: 30.8 G/DL (ref 31–37)
MCV RBC AUTO: 106.7 FL (ref 80–100)
MONOCYTES # BLD MANUAL: 0.43 K/UL (ref 0–1)
MYELOCYTES # BLD MANUAL: 0.21 K/UL
NEUTROPHILS NFR BLD MANUAL: 92 %
NEUTS SEG # BLD MANUAL: 19.69 K/UL (ref 1.3–7.7)
PCO2 BLDA: 54 MMHG (ref 35–45)
PH BLDA: 7.21 [PH] (ref 7.35–7.45)
PLATELET # BLD AUTO: 47 K/UL (ref 150–450)
PO2 BLDA: 52 MMHG (ref 83–108)
POTASSIUM SERPL-SCNC: 3.4 MMOL/L (ref 3.5–5.1)
SODIUM SERPL-SCNC: 144 MMOL/L (ref 137–145)
WBC # BLD AUTO: 21.4 K/UL (ref 3.8–10.6)

## 2018-02-13 RX ADMIN — POTASSIUM CHLORIDE SCH MLS/HR: 14.9 INJECTION, SOLUTION INTRAVENOUS at 08:15

## 2018-02-13 RX ADMIN — SODIUM CHLORIDE SCH MLS/HR: 234 INJECTION INTRAMUSCULAR; INTRAVENOUS; SUBCUTANEOUS at 10:43

## 2018-02-13 RX ADMIN — DEXTROSE MONOHYDRATE SCH MLS/HR: 5 INJECTION, SOLUTION INTRAVENOUS at 08:15

## 2018-02-13 RX ADMIN — DIGOXIN SCH MCG: 0.25 INJECTION INTRAMUSCULAR; INTRAVENOUS at 09:28

## 2018-02-13 RX ADMIN — CHLORHEXIDINE GLUCONATE SCH ML: 1.2 RINSE ORAL at 09:08

## 2018-02-13 RX ADMIN — Medication SCH MLS/HR: at 10:16

## 2018-02-13 RX ADMIN — CEFAZOLIN SCH MLS/HR: 330 INJECTION, POWDER, FOR SOLUTION INTRAMUSCULAR; INTRAVENOUS at 06:59

## 2018-02-13 RX ADMIN — PROPOFOL SCH MLS/HR: 10 INJECTION, EMULSION INTRAVENOUS at 06:24

## 2018-02-13 RX ADMIN — PANTOPRAZOLE SODIUM SCH MG: 40 INJECTION, POWDER, FOR SOLUTION INTRAVENOUS at 09:09

## 2018-02-13 RX ADMIN — Medication SCH MLS/HR: at 05:42

## 2018-02-13 RX ADMIN — DEXTROSE MONOHYDRATE SCH MLS/HR: 5 INJECTION, SOLUTION INTRAVENOUS at 00:26

## 2018-02-13 NOTE — CONS
CONSULTATION



DATE OF SERVICE:

02/13/2018



REASON FOR CONSULTATION:

Bacteremia and septic shock.



HISTORY OF PRESENT ILLNESS:

The patient is a 62-year-old,  female, brought into the ER at [facility] on 11th of February 2018. Apparently the EMS was called to the [place] by 
neighbor

who regularly checks on her. On arrival to [place], the EMS noticed the patient 
lying in

the bed, soaked in her own urine.  The patient appears very fatigued.  The 
patient

apparently has been recently treated with urinary tract infection with oral

amoxicillin. The patient was transferred to the ER where the patient did have a 
chest x-

ray, which shows bibasilar infiltrate within the lingula and posterior medial 
right

lower lobe.  The patient was afebrile on presentation; however, subsequently 
did spike

a fever of 102 degrees Fahrenheit.  The patient has been tachycardic with heart 
rates

in 120s to 130s to 140s.  The patient was significantly hypotensive on arrival 
to the

ER with systolic 90 went down to 85 systolic.  She has been treated with 
multiple fluid

boluses and max support of the Levophed and vasopressin.  The patient did have 
a white

count 10.6.  Subsequently jumped to 14.1 yesterday and 21.4 today.  The patient 
did

have blood cultures as well as sputum culture now showing strep pneumo, hence, 
ID was

consulted for further recommendation regarding antibiotic therapy.  All of this 
has

been obtained from prior review of the chart and talking to the nurse staff.  
The

patient herself is unable to provide any reliable history.



REVIEW OF SYSTEMS:

Could not be reliably obtained though the positive points have been mentioned 
in the

HPI.



PAST MEDICAL HISTORY:

Osteoarthritis, UTI.



PAST SURGICAL HISTORY:

No reported surgeries.



SOCIAL HISTORY:

Positive for heavy smoking and drinking. No drug use.



FAMILY HISTORY:

No pertinent findings in the chart.



ALLERGIES:

No known drug allergies.



MEDICATIONS:

Medications include the patient is currently on digoxin, Narcan, Levophed, 
Protonix,

piperacillin tazobactam, propofol, vancomycin pharmacy to dose.  



PHYSICAL EXAMINATION:

On examination, her blood pressure is 85/53 with a pulse of 118, temperature of 
99.

She is on 100% FiO2.

General description is a middle aged female, lying in bed, intubated on the 
vent.

HEENT examination shows no pallor or scleral icterus.  The patient is orally 
intubated.

Limited examination of oral cavity.

NECK: Trachea central. There is no thyromegaly.

LUNGS: Unlabored breathing.  Coarse breath sounds bilaterally, no wheeze.

HEART: S1, S2.  Tachycardic. No loud murmur.

ABDOMEN:  Soft, no tenderness. No guarding or rigidity.

EXTREMITIES: No edema of feet. Feet are mottled.

SKIN EXAMINATION: No rash or mass palpable.

NEUROLOGICAL: The patient is currently sedated on the vent.  Neuro exam could 
not be

completed.



LABS:

Hemoglobin is 14, white count 21.4 with a BUN of 75, creatinine 1.90. Blood and 
sputum

culture with strep pneumo.



DIAGNOSTIC IMPRESSION AND PLAN:

Patient with sepsis with septic shock in a patient who do have pneumonia with 
blood and

sputum culture lastly showing a Streptococcus pneumoniae likely community-
acquired,

however, the patient was noticed to be unresponsive underlying component of 
aspiration is

not entirely excluded.  The patient has been on outpatient amoxicillin, likely a

penicillin resistant pathogen.  The patient currently max support off the 
pressors as

well as oxygen and do carry overall very grim prognosis and survival. The 
patient

is high risk of toxicity from the medication that she needs to be treated for

underlying severe sepsis from the Streptococcus pneumoniae.



PLAN:

1. Recommend to restart the vancomycin pharmacy to dose with careful watching 
of her

    trough and kidney function as we have to make sure we are not dealing with a

    penicillin and cephalosporin resistant pathogen and will continue Zosyn 
that should

    take care of the pathogen associated with the aspiration pneumonia.

2. Aggressive IV fluid.

3. We will follow up on clinical condition and culture to further adjust 
medication if

    needed.

Thank you for this consultation. Will follow this patient along with you.





MMODL / IJN: [id number] / Job#: [id number]

AUTUMN

## 2018-02-13 NOTE — XR
EXAMINATION TYPE: XR chest 1V portable

 

DATE OF EXAM: 2/13/2018

 

CLINICAL HISTORY: Difficulty breathing progress study.  

 

TECHNIQUE: Single AP portable semiupright view of the chest is obtained.

 

COMPARISON: Chest x-ray from one day earlier and older studies.

 

FINDINGS:  An endotracheal tube, orogastric tube, and right internal jugular central venous catheter 
are all stable in appearance.

 

There is persistent bibasilar opacity silhouetting hemidiaphragms consistent with small bilateral ple
ural effusions and associated bibasilar atelectasis and/or infiltrates. Upper lungs remain clear with
out pneumothorax. Cardiac silhouette size is stable and felt within normal limits. Osseous structures
 are intact.

 

IMPRESSION:  Overall stable findings,  persistent left greater than right bibasilar infiltrate and/or
 atelectasis and suspected small bilateral pleural effusions all redemonstrated.

## 2018-03-19 NOTE — P.DS
Providers


Date of admission: 


02/11/18 16:17





Expected date of discharge: 02/13/18


Attending physician: 


Donell Vaughan





Consults: 





 





02/11/18 16:17


Consult Physician Stat 


   Consulting Provider: Oscar Logan


   Consult Reason/Comments: Severe Sepsis


   Do you want consulting provider notified?: Already Contacted





02/11/18 22:57


Consult Physician Routine 


   Consulting Provider: Loren Puentes


   Consult Reason/Comments: afib rvr


   Do you want consulting provider notified?: Yes, Notify in am





02/13/18 00:22


Consult Physician Routine 


   Consulting Provider: Josephine Jensen


   Consult Reason/Comments: positive blood culture


   Do you want consulting provider notified?: Yes, Notify in am











Primary care physician: 


Thom Berg





Hospital Course: 








Discharge diagnosis


Acute Hypoxemic respiratory failure.





Septic shock





Bibasilar pneumonia





New onset atrial fibrillation with rapid ventricular response





Severe alcoholic liver disease and chronic alcohol abuse





Alcoholic cirrhosis





Profound hypotension





Severe metabolic acidosis





Streptococcus pneumoniae bacteremia





Multiorgan system failure





Profound hypotension, despite maximal dose norepinephrine and vasopressin








Hospital course


Patient is a 62-year-old female with a known history of heavy alcohol abuse was 

found lying in bed soaked in her own urine.  Patient was brought to the 

hospital we are EMS.  Patient was very weak and fatigued and was having 

respiratory distress.  Patient was started on broad-spectrum antibiotics due to 

sepsis and septic shock and was transferred to ICU due to respiratory distress 

and hypoxemia.  Patient was also found have new onset atrial fibrillation.  

Patient was also given fluid boluses and patient remained on ventilator 

support.  Patient was not this point to the maximal medical therapy.  Patient 

is also on maximal dose of pressors support.  Patient does have end-stage liver 

disease.  Patient also hypoxemic and in septic shock.  Patient was given 8-10 L 

altogether.  Prognosis very poor.  Otherwise pulmonary recommends to transfer 

the patient to tertiary care facility for further management upon discussion 

with the family.  Discussed with the [facility] transfer team and 

patient was transferred.  Patient's condition remained serious.





Discharge medication reconciliation was done.


Discharge physical examination and vitals reviewed





Total time taken greater than 35 minutes including 18 minutes for counseling 

and coordination of care.





Patient Condition at Discharge: Good





Plan - Discharge Summary


New Discharge Prescriptions: 


No Action


   Tolterodine Tartrate [Detrol LA] 2 mg PO DAILY


   Vitamin B Complex 1 cap PO DAILY


Discharge Medication List





Tolterodine Tartrate [Detrol LA] 2 mg PO DAILY 02/11/18 [History]


Vitamin B Complex 1 cap PO DAILY 02/11/18 [History]








Follow up Appointment(s)/Referral(s): 


Morena Tomas MD [Primary Care Provider] - 1-2 days


Discharge Disposition: TRANSFER TO SHORT [facility]

## 2019-04-04 NOTE — P.PN
Subjective


Progress Note Date: 02/13/18





This 62-year-old female is admitted to hospital with bilateral pneumonia.  

Patient also has history of alcohol is a liver disease.  Patient is still 

intubated.  Patient clinical status not improving.  Patient is being 

transferred to [facility].  Patient is still in atrial 

fibrillation.  Patient is getting IV Lanoxin with control of the rate.  Patient 

is taken off anticoagulation because of thrombocytopenia.





Objective





- Vital Signs


Vital signs: 


 Vital Signs











Temp  98.1 F   02/13/18 08:00


 


Pulse  118 H  02/13/18 11:00


 


Resp  27 H  02/13/18 11:00


 


BP  90/63   02/13/18 10:15


 


Pulse Ox  86 L  02/13/18 08:00








 Intake & Output











 02/12/18 02/13/18 02/13/18





 18:59 06:59 18:59


 


Intake Total 2562.557 2770.545 511.375


 


Output Total 130 161 46


 


Balance 2432.557 2609.545 465.375


 


Weight 53.8 kg 66.7 kg 66.7 kg


 


Intake:   


 


  IV 2065 1632 277


 


    0.9  33 43


 


    Calcium Gluconate 1,000 100  100





    mg In Sodium Chloride 0.9   





    % 100 ml @ 100 mls/hr   





    IVPB BID [facility] Rx#:   





    [id number]   


 


    Dextrose 5%-0.45% NaCl 1, 1375 1500 125





    000 ml @ 125 mls/hr IV .   





    Q9H12M CLEM with Sodium   





    Bicarb (1 Meq/ml) 150 ml   





    Rx#:[id number]   


 


    Magnesium Sulfate-D5w Pmx 200  





    1 gm In Dextrose/Water 1   





    100ml.bag @ 100 mls/hr   





    IVPB Q1H [facility] Rx#:   





    [id number]   


 


    Piperacillin-Tazobactam 3 100  





    .375 gm In Dextrose/Water   





    1 50ml.bag @ 12.5 mls/hr   





    IVPB ONCE STA Rx#:   





    [id number]   


 


    Sodium Chloride 0.9% 1, 200  





    000 ml @ 100 mls/hr IV .   





    Q10H7M ONE with Mvi,   





    Adult No.4 with Vit K 10   





    ml with Thiamine 100 mg   





    with Folic Acid 1 mg Rx#:   





    [id number]   


 


    Sodium Chloride 0.9% 99 90 99 9





    ml @ 0.03 UNITS/MIN 9 mls   





    /hr IV .Q11H7M CLEM with   





    Vasopressin 20 unit Rx#:   





    [id number]   


 


  Intake, IV Titration 439.395 8423.545 234.375





  Amount   


 


    Calcium Gluconate 1,000  100 





    mg In Sodium Chloride 0.9   





    % 100 ml @ 100 mls/hr   





    IVPB BID [facility] Rx#:   





    [id number]   


 


    Heparin Sod,Pork in 0.45% 148.713 240.922 





    NaCl 25,000 unit In 0.45   





    % NaCl 1 500ml.bag @ 12   





    UNITS/KG/HR 12.41 mls/hr   





    IV .Q24H [facility] Rx#:   





    [id number]   


 


    Norepinephrin 16 mg-0.9% 339.844 438.656 184.375





    Ns Pmx 16 mg In 250 ml @   





    Titrate IV .Q0M [facility] Rx#:   





    [id number]   


 


    Piperacillin-Tazobactam 3  50.0 50





    .375 gm In Dextrose/Water   





    1 50ml.bag @ 12.5 mls/hr   





    IVPB Q8HR [facility] Rx#:   





    [id number]   


 


    Propofol 1,000 mg In  58.967 





    Empty Bag 1 bag @ Titrate   





    IV .Q0M [facility] Rx#:   





    [id number]   


 


    Sodium Chloride 0.9% 99 9  





    ml @ 0.03 UNITS/MIN 9 mls   





    /hr IV .Q11H7M CLEM with   





    Vasopressin 20 unit Rx#:   





    [id number]   


 


    Vancomycin 1,000 mg In  250 





    Sodium Chloride 0.9% 250   





    ml @ 125 mls/hr IVPB ONCE   





    STA Rx#:[id number]   


 


Output:   


 


  Urine 130 161 46


 


Other:   


 


  Voiding Method Indwelling Catheter Indwelling Catheter Indwelling Catheter








 ABP, PAP, CO, CI - Last Documented











Arterial Blood Pressure        85/53

















- Exam





GENERAL EXAM: Patient intubated and sedated


HEENT: Normocephalic. Normal reaction of pupils, equal size, normal range of 

extraocular motion. No erythema or exudates in the throat.


NECK: No masses, no nuchal rigidity.


CHEST: No chest wall deformity.


LUNGS: Diminished breath sounds


HEART: S1 and S2 normal.  Distant heart sounds


ABDOMEN: Deferred


CENTRAL NERVOUS SYSTEM: Deferred


EXTREMITIES: No cyanosis, clubbing or edema.





- Labs


CBC & Chem 7: 


 02/13/18 04:41





 02/13/18 04:41


Labs: 


 Abnormal Lab Results - Last 24 Hours (Table)











  02/12/18 02/13/18 02/13/18 Range/Units





  14:15 04:39 04:41 


 


WBC    21.4 H  (3.8-10.6)  k/uL


 


MCV    106.7 H  (80.0-100.0)  fL


 


MCHC    30.8 L  (31.0-37.0)  g/dL


 


Plt Count    47 L* D  (150-450)  k/uL


 


Neutrophils # (Manual)    19.69 H  (1.3-7.7)  k/uL


 


Myelocytes # (Manual)    0.21 H  (0)  k/uL


 


Nucleated RBCs    5 H  (0-0)  /100 WBC


 


APTT  47.6 H    (22.0-30.0)  sec


 


ABG pH   7.21 L   (7.35-7.45)  


 


ABG pCO2   54 H   (35-45)  mmHg


 


ABG pO2   52 L   ()  mmHg


 


ABG O2 Saturation   86.0 L   (94-97)  %


 


Potassium     (3.5-5.1)  mmol/L


 


Chloride     ()  mmol/L


 


BUN     (7-17)  mg/dL


 


Creatinine     (0.52-1.04)  mg/dL


 


Glucose     (74-99)  mg/dL


 


Calcium     (8.4-10.2)  mg/dL


 


Ionized Calcium Jonny     (4.5-5.3)  mg/dL














  02/13/18 02/13/18 Range/Units





  04:41 05:59 


 


WBC    (3.8-10.6)  k/uL


 


MCV    (80.0-100.0)  fL


 


MCHC    (31.0-37.0)  g/dL


 


Plt Count    (150-450)  k/uL


 


Neutrophils # (Manual)    (1.3-7.7)  k/uL


 


Myelocytes # (Manual)    (0)  k/uL


 


Nucleated RBCs    (0-0)  /100 WBC


 


APTT    (22.0-30.0)  sec


 


ABG pH    (7.35-7.45)  


 


ABG pCO2    (35-45)  mmHg


 


ABG pO2    ()  mmHg


 


ABG O2 Saturation    (94-97)  %


 


Potassium  3.4 L   (3.5-5.1)  mmol/L


 


Chloride  113 H   ()  mmol/L


 


BUN  75 H   (7-17)  mg/dL


 


Creatinine  1.90 H   (0.52-1.04)  mg/dL


 


Glucose  141 H   (74-99)  mg/dL


 


Calcium  6.4 L*   (8.4-10.2)  mg/dL


 


Ionized Calcium Jonny   4.1 L  (4.5-5.3)  mg/dL








 Microbiology - Last 24 Hours (Table)











 02/11/18 18:20 Gram Stain - Preliminary





 Sputum Sputum Culture - Preliminary





    Streptococcus pneumoniae


 


 02/11/18 14:00 Urine Culture - Final





 Urine,Catheterized 


 


 02/11/18 14:00 Blood Culture Gram Stain - Preliminary





 Blood Blood Culture - Preliminary





    Streptococcus pneumoniae














Assessment and Plan


(1) Atrial fibrillation with RVR


Current Visit: Yes   Status: Acute   Code(s): I48.91 - UNSPECIFIED ATRIAL 

FIBRILLATION   SNOMED Code(s): 327320527656247


   





(2) Septic shock


Current Visit: Yes   Status: Acute   Code(s): A41.9 - SEPSIS, UNSPECIFIED 

ORGANISM; R65.21 - SEVERE SEPSIS WITH SEPTIC SHOCK   SNOMED Code(s): 46744596


   





(3) Bilateral pneumonia


Current Visit: Yes   Status: Acute   Code(s): J18.9 - PNEUMONIA, UNSPECIFIED 

ORGANISM   SNOMED Code(s): 178965137


   





(4) History of alcohol abuse


Current Visit: Yes   Status: Acute   Code(s): Z87.898 - PERSONAL HISTORY OF 

OTHER SPECIFIED CONDITIONS   SNOMED Code(s): 203437395


   


Plan: 


Patient is being transferred to [facility] for further care
Subjective


Progress Note Date: 02/13/18


Principal diagnosis: 





Respiratory failure





Progress note dated 02/13/2018





This is a 62-year-old female who was initially evaluated in the emergency 

department.  The patient has a history of heavy alcohol abuse and has been 

drinking up until the time of admission.  EMS was called.  The patient was 

lying in her bed soaked in her own urine.  She apparently was very weak and 

fatigued and also was having some respiratory issues.  She had been treated for 

a urinary tract infection with amoxicillin.  She apparently had and the alcohol 

bottles strewn throughout the house.  She was admitted to the ICU because of 

alcohol abuse and sepsis and possible pneumonia and new onset atrial 

fibrillation.  Subsequent to me talking to the ER doctor, the patient was 

intubated for respiratory failure.  The patient was transferred to the ICU on 

the ventilator.  She remains on the ventilator doing very poorly.  I do long 

discussion with the family yesterday.  They're inclined to continue a full life 

support at the current time.  Overall prognosis is very poor.  She is on 

maximal medical therapy she's not really shown much improvement.  She is maxed 

out on both norepinephrine and vasopressin.  She received lots of fluids.  

Despite that, she is profoundly hypotensive and hypoxemic.  She has developed 

multiorgan system failure.  Blood cultures came back positive for Streptococcus 

pneumoniae and she was started on vancomycin.  Infectious disease was 

consulted.  Currently, she is on the assist control mode with a rate of 26 

tidal volume of 350 FiO2 of 100% and PEEP of 5.  Her arterial blood gases show 

a PaO2 of 52 a PaCO2 of 53 and a pH of 7.2.  She is receiving vasopressin at 

0.03 units per minute, norepinephrine at 50 mics per minute, dextrose with half-

normal saline and 3 Amps of bicarb at 125 mL an hour and propofol at 10 mics 

per kilogram per minute.





Objective





- Vital Signs


Vital signs: 


 Vital Signs











Temp  98.1 F   02/13/18 08:00


 


Pulse  129 H  02/13/18 09:00


 


Resp  32 H  02/13/18 09:00


 


BP  108/58   02/13/18 08:00


 


Pulse Ox  86 L  02/13/18 08:00








 Intake & Output











 02/12/18 02/13/18 02/13/18





 18:59 06:59 18:59


 


Intake Total 2562.557 2770.545 207


 


Output Total 130 161 31


 


Balance 2432.557 2609.545 176


 


Weight 53.8 kg 66.7 kg 66.7 kg


 


Intake:   


 


  IV 2065 1632 157


 


    0.9  33 23


 


    Calcium Gluconate 1,000 100  





    mg In Sodium Chloride 0.9   





    % 100 ml @ 100 mls/hr   





    IVPB BID [facility] Rx#:   





    [id number]   


 


    Dextrose 5%-0.45% NaCl 1, 1375 1500 125





    000 ml @ 125 mls/hr IV .   





    Q9H12M CLEM with Sodium   





    Bicarb (1 Meq/ml) 150 ml   





    Rx#:[id number]   


 


    Magnesium Sulfate-D5w Pmx 200  





    1 gm In Dextrose/Water 1   





    100ml.bag @ 100 mls/hr   





    IVPB Q1H [facility] Rx#:   





    [id number]   


 


    Piperacillin-Tazobactam 3 100  





    .375 gm In Dextrose/Water   





    1 50ml.bag @ 12.5 mls/hr   





    IVPB ONCE STA Rx#:   





    [id number]   


 


    Sodium Chloride 0.9% 1, 200  





    000 ml @ 100 mls/hr IV .   





    Q10H7M ONE with Mvi,   





    Adult No.4 with Vit K 10   





    ml with Thiamine 100 mg   





    with Folic Acid 1 mg Rx#:   





    [id number]   


 


    Sodium Chloride 0.9% 99 90 99 9





    ml @ 0.03 UNITS/MIN 9 mls   





    /hr IV .Q11H7M [facility]M with   





    Vasopressin 20 unit Rx#:   





    [id number]   


 


  Intake, IV Titration 349.573 8028.545 50





  Amount   


 


    Calcium Gluconate 1,000  100 





    mg In Sodium Chloride 0.9   





    % 100 ml @ 100 mls/hr   





    IVPB BID [facility] Rx#:   





    [id number]   


 


    Heparin Sod,Pork in 0.45% 148.713 240.922 





    NaCl 25,000 unit In 0.45   





    % NaCl 1 500ml.bag @ 12   





    UNITS/KG/HR 12.41 mls/hr   





    IV .Q24H [facility] Rx#:   





    [id number]   


 


    Norepinephrin 16 mg-0.9% 339.844 438.656 





    Ns Pmx 16 mg In 250 ml @   





    Titrate IV .Q0M [facility] Rx#:   





    [id number]   


 


    Piperacillin-Tazobactam 3  50.0 50





    .375 gm In Dextrose/Water   





    1 50ml.bag @ 12.5 mls/hr   





    IVPB Q8HR [facility] Rx#:   





    [id number]   


 


    Propofol 1,000 mg In  58.967 





    Empty Bag 1 bag @ Titrate   





    IV .Q0M [facility] Rx#:   





    [id number]   


 


    Sodium Chloride 0.9% 99 9  





    ml @ 0.03 UNITS/MIN 9 mls   





    /hr IV .Q11H7M CLEM with   





    Vasopressin 20 unit Rx#:   





    [id number]   


 


    Vancomycin 1,000 mg In  250 





    Sodium Chloride 0.9% 250   





    ml @ 125 mls/hr IVPB ONCE   





    STA Rx#:[id number]   


 


Output:   


 


  Urine 130 161 31


 


Other:   


 


  Voiding Method Indwelling Catheter Indwelling Catheter 








 ABP, PAP, CO, CI - Last Documented











Arterial Blood Pressure        101/59

















- Exam





No acute distress, sedated, orally placed endotracheal tube and NG tube





HEENT examination is grossly unremarkable.  





Neck supple.  Full range of motion.  No adenopathy thyromegaly or neck vein 

distention.





Cardiovascular examination reveals regular rhythm rate.  S1-S2 normal.  No S3 

or S4.  No discernible murmur noted.  Patient is tachycardic.





Lungs reveal diminished breath sounds.  There is bilateral rhonchi.  No 

crackles.  No wheezes.  Breath sounds are diminished.





Abdomen soft and bowel sounds are heard.  No masses or tenderness.  Mildly 

distended.





Extremities are intact.  Patient has significant mottling and livedo 

reticularis..





Skin reveals acrocyanosis.





Neurologic examination could not be adequately assessed.





- Labs


CBC & Chem 7: 


 02/13/18 04:41





 02/13/18 04:41


Labs: 


 Abnormal Lab Results - Last 24 Hours (Table)











  02/12/18 02/12/18 02/13/18 Range/Units





  09:13 14:15 04:39 


 


WBC     (3.8-10.6)  k/uL


 


MCV     (80.0-100.0)  fL


 


MCHC     (31.0-37.0)  g/dL


 


Plt Count     (150-450)  k/uL


 


Neutrophils # (Manual)     (1.3-7.7)  k/uL


 


Myelocytes # (Manual)     (0)  k/uL


 


Nucleated RBCs     (0-0)  /100 WBC


 


APTT   47.6 H   (22.0-30.0)  sec


 


ABG pH  7.10 L*   7.21 L  (7.35-7.45)  


 


ABG pCO2  55 H   54 H  (35-45)  mmHg


 


ABG pO2  76 L   52 L  ()  mmHg


 


ABG HCO3  17 L    (21-25)  mmol/L


 


ABG O2 Saturation  93.4 L   86.0 L  (94-97)  %


 


Potassium     (3.5-5.1)  mmol/L


 


Chloride     ()  mmol/L


 


BUN     (7-17)  mg/dL


 


Creatinine     (0.52-1.04)  mg/dL


 


Glucose     (74-99)  mg/dL


 


Calcium     (8.4-10.2)  mg/dL


 


Ionized Calcium Jonny     (4.5-5.3)  mg/dL














  02/13/18 02/13/18 02/13/18 Range/Units





  04:41 04:41 05:59 


 


WBC  21.4 H    (3.8-10.6)  k/uL


 


MCV  106.7 H    (80.0-100.0)  fL


 


MCHC  30.8 L    (31.0-37.0)  g/dL


 


Plt Count  47 L* D    (150-450)  k/uL


 


Neutrophils # (Manual)  19.69 H    (1.3-7.7)  k/uL


 


Myelocytes # (Manual)  0.21 H    (0)  k/uL


 


Nucleated RBCs  5 H    (0-0)  /100 WBC


 


APTT     (22.0-30.0)  sec


 


ABG pH     (7.35-7.45)  


 


ABG pCO2     (35-45)  mmHg


 


ABG pO2     ()  mmHg


 


ABG HCO3     (21-25)  mmol/L


 


ABG O2 Saturation     (94-97)  %


 


Potassium   3.4 L   (3.5-5.1)  mmol/L


 


Chloride   113 H   ()  mmol/L


 


BUN   75 H   (7-17)  mg/dL


 


Creatinine   1.90 H   (0.52-1.04)  mg/dL


 


Glucose   141 H   (74-99)  mg/dL


 


Calcium   6.4 L*   (8.4-10.2)  mg/dL


 


Ionized Calcium Jonny    4.1 L  (4.5-5.3)  mg/dL








 Microbiology - Last 24 Hours (Table)











 02/11/18 14:00 Urine Culture - Final





 Urine,Catheterized 


 


 02/11/18 14:00 Blood Culture Gram Stain - Preliminary





 Blood Blood Culture - Preliminary





    Streptococcus pneumoniae


 


 02/11/18 14:00 Blood Culture - Final





 Blood 














Assessment and Plan


Assessment: 





Assessment 





Hypoxemic respiratory failure.





Septic shock





Bibasilar pneumonia





New onset atrial fibrillation with rapid ventricular response





Severe alcoholic liver disease and chronic alcohol abuse





Alcoholic cirrhosis





Profound hypotension





Severe metabolic acidosis





Streptococcus pneumoniae bacteremia





Multiorgan system failure





Profound hypotension, despite maximal dose norepinephrine and vasopressin


Plan: 





Plan dated 02/12/2018





I do long talk with the family.  This included adult daughter Guerline, who is a 

nurse and before.  They wish to give their mother a bit more time on the 

ventilator.  I think overall prognosis is very poor.  She is on quite a bit of 

support including mechanical ventilation and a number of medications to 

maintain blood pressure.  She develop new onset atrial fibrillation with RVR.  

Has a history of severe alcoholic liver disease and liver cirrhosis.  Currently 

on sodium bicarbonate drip norepinephrine vasopressin heparin and propofol. 





Plan dated 02/13/2018





I had a long talk with the family yesterday.  They're inclined to continue full 

life support at this time.  I really pain in the very grim picture to them.  

Apparently one of the dogs daughters is a nurse on the fourth floor here.  The 

patient has really not responded to maximal medical therapy.  She is on maximal 

doses of norepinephrine and vasopressin.  Her blood pressure remains low.  She 

does receive more than 8 or 10 L of fluid.  She has severe end-stage liver 

disease.  Her blood gases show profound hypoxemic with a respiratory acidosis.  

The patient's PEEP will be increased from 5 to 10.  She'll be started on 

vancomycin for her Streptococcus pneumoniae bacteremia.  Again prognosis is 

very poor.  I think the patient should be made a comfort measures patient.  We'

ll have another discussion with the family today.  Time spent with patient was 

32 minutes


Time with Patient: Greater than 30
Is This A New Presentation, Or A Follow-Up?: Cyst